# Patient Record
Sex: MALE | Race: WHITE | NOT HISPANIC OR LATINO | Employment: UNEMPLOYED | URBAN - METROPOLITAN AREA
[De-identification: names, ages, dates, MRNs, and addresses within clinical notes are randomized per-mention and may not be internally consistent; named-entity substitution may affect disease eponyms.]

---

## 2024-06-11 ENCOUNTER — TRANSFERRED RECORDS (OUTPATIENT)
Dept: HEALTH INFORMATION MANAGEMENT | Facility: CLINIC | Age: 24
End: 2024-06-11

## 2024-07-10 ENCOUNTER — MEDICAL CORRESPONDENCE (OUTPATIENT)
Dept: HEALTH INFORMATION MANAGEMENT | Facility: CLINIC | Age: 24
End: 2024-07-10
Payer: COMMERCIAL

## 2024-07-11 ENCOUNTER — TRANSCRIBE ORDERS (OUTPATIENT)
Dept: OTHER | Age: 24
End: 2024-07-11

## 2024-07-11 DIAGNOSIS — F19.90 POLYSUBSTANCE USE DISORDER: ICD-10-CM

## 2024-07-11 DIAGNOSIS — G40.909 INTELLECTUAL DISABILITY WITH EPILEPSY (H): Primary | ICD-10-CM

## 2024-07-11 DIAGNOSIS — F79 INTELLECTUAL DISABILITY WITH EPILEPSY (H): Primary | ICD-10-CM

## 2024-07-22 ENCOUNTER — HOSPITAL ENCOUNTER (EMERGENCY)
Facility: HOSPITAL | Age: 24
Discharge: HOME | End: 2024-07-23
Attending: EMERGENCY MEDICINE | Admitting: EMERGENCY MEDICINE
Payer: COMMERCIAL

## 2024-07-22 ENCOUNTER — TELEPHONE (OUTPATIENT)
Dept: BEHAVIORAL HEALTH | Facility: CLINIC | Age: 24
End: 2024-07-22

## 2024-07-22 DIAGNOSIS — F23 ACUTE PSYCHOSIS (H): ICD-10-CM

## 2024-07-22 PROBLEM — F29: Status: ACTIVE | Noted: 2024-07-22

## 2024-07-22 LAB
ALBUMIN SERPL BCG-MCNC: 4.9 G/DL (ref 3.5–5.2)
ALP SERPL-CCNC: 76 U/L (ref 40–150)
ALT SERPL W P-5'-P-CCNC: 87 U/L (ref 0–70)
ANION GAP SERPL CALCULATED.3IONS-SCNC: 11 MMOL/L (ref 7–15)
APAP SERPL-MCNC: <5 UG/ML (ref 10–30)
AST SERPL W P-5'-P-CCNC: 46 U/L (ref 0–45)
BASOPHILS # BLD AUTO: 0.1 10E3/UL (ref 0–0.2)
BASOPHILS NFR BLD AUTO: 1 %
BILIRUB DIRECT SERPL-MCNC: <0.2 MG/DL (ref 0–0.3)
BILIRUB SERPL-MCNC: 0.5 MG/DL
BUN SERPL-MCNC: 10.2 MG/DL (ref 6–20)
CALCIUM SERPL-MCNC: 9.7 MG/DL (ref 8.8–10.4)
CHLORIDE SERPL-SCNC: 104 MMOL/L (ref 98–107)
CREAT SERPL-MCNC: 0.97 MG/DL (ref 0.67–1.17)
EGFRCR SERPLBLD CKD-EPI 2021: >90 ML/MIN/1.73M2
EOSINOPHIL # BLD AUTO: 0.3 10E3/UL (ref 0–0.7)
EOSINOPHIL NFR BLD AUTO: 4 %
ERYTHROCYTE [DISTWIDTH] IN BLOOD BY AUTOMATED COUNT: 12.6 % (ref 10–15)
ETHANOL SERPL-MCNC: <0.01 G/DL
GLUCOSE SERPL-MCNC: 96 MG/DL (ref 70–99)
HCO3 SERPL-SCNC: 23 MMOL/L (ref 22–29)
HCT VFR BLD AUTO: 48.7 % (ref 40–53)
HGB BLD-MCNC: 16.5 G/DL (ref 13.3–17.7)
HOLD SPECIMEN: NORMAL
IMM GRANULOCYTES # BLD: 0 10E3/UL
IMM GRANULOCYTES NFR BLD: 0 %
INR PPP: 1.06 (ref 0.85–1.15)
LIPASE SERPL-CCNC: 15 U/L (ref 13–60)
LITHIUM SERPL-SCNC: 0.41 MMOL/L (ref 0.6–1.2)
LYMPHOCYTES # BLD AUTO: 2.2 10E3/UL (ref 0.8–5.3)
LYMPHOCYTES NFR BLD AUTO: 33 %
MAGNESIUM SERPL-MCNC: 1.9 MG/DL (ref 1.7–2.3)
MCH RBC QN AUTO: 30.8 PG (ref 26.5–33)
MCHC RBC AUTO-ENTMCNC: 33.9 G/DL (ref 31.5–36.5)
MCV RBC AUTO: 91 FL (ref 78–100)
MONOCYTES # BLD AUTO: 0.6 10E3/UL (ref 0–1.3)
MONOCYTES NFR BLD AUTO: 9 %
NEUTROPHILS # BLD AUTO: 3.5 10E3/UL (ref 1.6–8.3)
NEUTROPHILS NFR BLD AUTO: 53 %
NRBC # BLD AUTO: 0 10E3/UL
NRBC BLD AUTO-RTO: 0 /100
PLATELET # BLD AUTO: 215 10E3/UL (ref 150–450)
POTASSIUM SERPL-SCNC: 5.4 MMOL/L (ref 3.4–5.3)
PROT SERPL-MCNC: 7.6 G/DL (ref 6.4–8.3)
RBC # BLD AUTO: 5.35 10E6/UL (ref 4.4–5.9)
SALICYLATES SERPL-MCNC: <0.3 MG/DL
SARS-COV-2 RNA RESP QL NAA+PROBE: NEGATIVE
SODIUM SERPL-SCNC: 138 MMOL/L (ref 135–145)
TSH SERPL DL<=0.005 MIU/L-ACNC: 1.13 UIU/ML (ref 0.3–4.2)
WBC # BLD AUTO: 6.6 10E3/UL (ref 4–11)

## 2024-07-22 PROCEDURE — 83735 ASSAY OF MAGNESIUM: CPT | Performed by: EMERGENCY MEDICINE

## 2024-07-22 PROCEDURE — 80076 HEPATIC FUNCTION PANEL: CPT | Performed by: EMERGENCY MEDICINE

## 2024-07-22 PROCEDURE — 36415 COLL VENOUS BLD VENIPUNCTURE: CPT | Performed by: EMERGENCY MEDICINE

## 2024-07-22 PROCEDURE — 80178 ASSAY OF LITHIUM: CPT | Performed by: EMERGENCY MEDICINE

## 2024-07-22 PROCEDURE — 85610 PROTHROMBIN TIME: CPT | Performed by: STUDENT IN AN ORGANIZED HEALTH CARE EDUCATION/TRAINING PROGRAM

## 2024-07-22 PROCEDURE — 87635 SARS-COV-2 COVID-19 AMP PRB: CPT | Performed by: EMERGENCY MEDICINE

## 2024-07-22 PROCEDURE — 84443 ASSAY THYROID STIM HORMONE: CPT | Performed by: EMERGENCY MEDICINE

## 2024-07-22 PROCEDURE — 80175 DRUG SCREEN QUAN LAMOTRIGINE: CPT | Performed by: EMERGENCY MEDICINE

## 2024-07-22 PROCEDURE — 85025 COMPLETE CBC W/AUTO DIFF WBC: CPT | Performed by: EMERGENCY MEDICINE

## 2024-07-22 PROCEDURE — 82077 ASSAY SPEC XCP UR&BREATH IA: CPT | Performed by: EMERGENCY MEDICINE

## 2024-07-22 PROCEDURE — 83690 ASSAY OF LIPASE: CPT | Performed by: EMERGENCY MEDICINE

## 2024-07-22 PROCEDURE — 80179 DRUG ASSAY SALICYLATE: CPT | Performed by: EMERGENCY MEDICINE

## 2024-07-22 PROCEDURE — 80143 DRUG ASSAY ACETAMINOPHEN: CPT | Performed by: EMERGENCY MEDICINE

## 2024-07-22 PROCEDURE — 82374 ASSAY BLOOD CARBON DIOXIDE: CPT | Performed by: EMERGENCY MEDICINE

## 2024-07-22 PROCEDURE — 250N000013 HC RX MED GY IP 250 OP 250 PS 637: Performed by: EMERGENCY MEDICINE

## 2024-07-22 PROCEDURE — 99283 EMERGENCY DEPT VISIT LOW MDM: CPT

## 2024-07-22 RX ORDER — LITHIUM CARBONATE 300 MG/1
300 TABLET, FILM COATED, EXTENDED RELEASE ORAL EVERY 12 HOURS SCHEDULED
Status: DISCONTINUED | OUTPATIENT
Start: 2024-07-22 | End: 2024-07-22

## 2024-07-22 RX ORDER — PROPRANOLOL HYDROCHLORIDE 10 MG/1
10 TABLET ORAL 2 TIMES DAILY
COMMUNITY
Start: 2024-04-17

## 2024-07-22 RX ORDER — ACYCLOVIR 400 MG/1
400 TABLET ORAL 2 TIMES DAILY
Status: DISCONTINUED | OUTPATIENT
Start: 2024-07-22 | End: 2024-07-23 | Stop reason: HOSPADM

## 2024-07-22 RX ORDER — LEVETIRACETAM 500 MG/1
1000 TABLET ORAL 2 TIMES DAILY
Status: DISCONTINUED | OUTPATIENT
Start: 2024-07-22 | End: 2024-07-23 | Stop reason: HOSPADM

## 2024-07-22 RX ORDER — LEVETIRACETAM 1000 MG/1
1000 TABLET ORAL 2 TIMES DAILY
COMMUNITY
Start: 2024-06-02

## 2024-07-22 RX ORDER — GABAPENTIN 300 MG/1
300 CAPSULE ORAL 3 TIMES DAILY
COMMUNITY
Start: 2024-03-11

## 2024-07-22 RX ORDER — PROPRANOLOL HYDROCHLORIDE 10 MG/1
10 TABLET ORAL 2 TIMES DAILY
Status: DISCONTINUED | OUTPATIENT
Start: 2024-07-22 | End: 2024-07-23 | Stop reason: HOSPADM

## 2024-07-22 RX ORDER — ACYCLOVIR 400 MG/1
400 TABLET ORAL 2 TIMES DAILY
COMMUNITY

## 2024-07-22 RX ORDER — LITHIUM CARBONATE 300 MG/1
600 TABLET, FILM COATED, EXTENDED RELEASE ORAL AT BEDTIME
Status: DISCONTINUED | OUTPATIENT
Start: 2024-07-23 | End: 2024-07-23 | Stop reason: HOSPADM

## 2024-07-22 RX ORDER — ATOMOXETINE 25 MG/1
100 CAPSULE ORAL EVERY MORNING
Status: DISCONTINUED | OUTPATIENT
Start: 2024-07-23 | End: 2024-07-23 | Stop reason: HOSPADM

## 2024-07-22 RX ORDER — QUETIAPINE FUMARATE 200 MG/1
200 TABLET, FILM COATED ORAL AT BEDTIME
COMMUNITY
Start: 2024-04-01

## 2024-07-22 RX ORDER — HYDROXYZINE HYDROCHLORIDE 50 MG/1
50 TABLET, FILM COATED ORAL ONCE
Status: COMPLETED | OUTPATIENT
Start: 2024-07-22 | End: 2024-07-22

## 2024-07-22 RX ORDER — ATOMOXETINE 100 MG/1
1 CAPSULE ORAL EVERY MORNING
COMMUNITY

## 2024-07-22 RX ORDER — LEVETIRACETAM 250 MG/1
250 TABLET ORAL 2 TIMES DAILY
COMMUNITY
Start: 2024-06-02

## 2024-07-22 RX ORDER — LEVETIRACETAM 250 MG/1
250 TABLET ORAL 2 TIMES DAILY
Status: DISCONTINUED | OUTPATIENT
Start: 2024-07-22 | End: 2024-07-23 | Stop reason: HOSPADM

## 2024-07-22 RX ORDER — LITHIUM CARBONATE 300 MG/1
600 TABLET, FILM COATED, EXTENDED RELEASE ORAL AT BEDTIME
COMMUNITY

## 2024-07-22 RX ORDER — GABAPENTIN 300 MG/1
300 CAPSULE ORAL 3 TIMES DAILY
Status: DISCONTINUED | OUTPATIENT
Start: 2024-07-22 | End: 2024-07-23 | Stop reason: HOSPADM

## 2024-07-22 RX ORDER — BENZTROPINE MESYLATE 0.5 MG/1
1 TABLET ORAL DAILY
COMMUNITY
Start: 2024-04-06

## 2024-07-22 RX ORDER — BENZTROPINE MESYLATE 0.5 MG/1
0.5 TABLET ORAL DAILY
Status: DISCONTINUED | OUTPATIENT
Start: 2024-07-23 | End: 2024-07-23 | Stop reason: HOSPADM

## 2024-07-22 RX ORDER — QUETIAPINE FUMARATE 100 MG/1
200 TABLET, FILM COATED ORAL AT BEDTIME
Status: DISCONTINUED | OUTPATIENT
Start: 2024-07-22 | End: 2024-07-23 | Stop reason: HOSPADM

## 2024-07-22 RX ADMIN — ACYCLOVIR 400 MG: 400 TABLET ORAL at 21:59

## 2024-07-22 RX ADMIN — LEVETIRACETAM 1000 MG: 500 TABLET, FILM COATED ORAL at 21:58

## 2024-07-22 RX ADMIN — HYDROXYZINE HYDROCHLORIDE 50 MG: 50 TABLET, FILM COATED ORAL at 16:14

## 2024-07-22 RX ADMIN — LEVETIRACETAM 250 MG: 250 TABLET, FILM COATED ORAL at 22:00

## 2024-07-22 RX ADMIN — GABAPENTIN 300 MG: 300 CAPSULE ORAL at 21:58

## 2024-07-22 RX ADMIN — QUETIAPINE FUMARATE 200 MG: 100 TABLET ORAL at 21:58

## 2024-07-22 RX ADMIN — PROPRANOLOL HYDROCHLORIDE 10 MG: 10 TABLET ORAL at 21:59

## 2024-07-22 ASSESSMENT — ENCOUNTER SYMPTOMS
FEVER: 0
COUGH: 0
DIARRHEA: 0
SHORTNESS OF BREATH: 0
VOMITING: 0
ABDOMINAL PAIN: 0
NAUSEA: 0

## 2024-07-22 ASSESSMENT — ACTIVITIES OF DAILY LIVING (ADL)
ADLS_ACUITY_SCORE: 35
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
## 2024-07-22 NOTE — ED TRIAGE NOTES
"Patient comes from [facility] in [place]. The last 3 weeks at treatment facility patient has been declining, less interactive/responsive, writing a lot in journal. Last night patient grabbed envelopes and started addressing them to various counties. Today patient expressed ideas of hurting himself. It was also brought up that roommates are not feeling safe around him, to which patient stated that \"they should.\" Darnell Pacheco, the tech who took patient here believes patient has a mental health  through [facility] in [facility].   Patient reportedly made threats to roommate last night, did not sleep, pacing all night.   Tech line at facility is [phone]  Maggie, head nurse: [phone]    When writer asked patient about suicidality, patient stated that he was not suicidal but he was having homicidal thoughts. When writer asked if the thoughts were directed at a particular person or in general, patient stated \"I don't know yet.\"        "

## 2024-07-22 NOTE — ED NOTES
"Pt says \"voices in my head have been telling me to hurt other people. Especially during group therapy\"   "

## 2024-07-23 ENCOUNTER — TELEPHONE (OUTPATIENT)
Dept: BEHAVIORAL HEALTH | Facility: CLINIC | Age: 24
End: 2024-07-23
Payer: COMMERCIAL

## 2024-07-23 VITALS
DIASTOLIC BLOOD PRESSURE: 59 MMHG | SYSTOLIC BLOOD PRESSURE: 103 MMHG | RESPIRATION RATE: 20 BRPM | HEART RATE: 68 BPM | TEMPERATURE: 98.7 F | OXYGEN SATURATION: 98 %

## 2024-07-23 LAB
AMPHETAMINES UR QL SCN: NORMAL
BARBITURATES UR QL SCN: NORMAL
BENZODIAZ UR QL SCN: NORMAL
BZE UR QL SCN: NORMAL
CANNABINOIDS UR QL SCN: NORMAL
FENTANYL UR QL: NORMAL
OPIATES UR QL SCN: NORMAL
PCP QUAL URINE (ROCHE): NORMAL

## 2024-07-23 PROCEDURE — 80307 DRUG TEST PRSMV CHEM ANLYZR: CPT | Performed by: EMERGENCY MEDICINE

## 2024-07-23 PROCEDURE — 250N000013 HC RX MED GY IP 250 OP 250 PS 637: Performed by: EMERGENCY MEDICINE

## 2024-07-23 PROCEDURE — 99244 OFF/OP CNSLTJ NEW/EST MOD 40: CPT | Mod: 95 | Performed by: REGISTERED NURSE

## 2024-07-23 RX ADMIN — ATOMOXETINE 100 MG: 25 CAPSULE ORAL at 08:12

## 2024-07-23 RX ADMIN — GABAPENTIN 300 MG: 300 CAPSULE ORAL at 13:35

## 2024-07-23 RX ADMIN — LEVETIRACETAM 250 MG: 250 TABLET, FILM COATED ORAL at 08:15

## 2024-07-23 RX ADMIN — BENZTROPINE MESYLATE 0.5 MG: 0.5 TABLET ORAL at 08:14

## 2024-07-23 RX ADMIN — GABAPENTIN 300 MG: 300 CAPSULE ORAL at 08:14

## 2024-07-23 RX ADMIN — LEVETIRACETAM 1000 MG: 500 TABLET, FILM COATED ORAL at 08:13

## 2024-07-23 RX ADMIN — ACYCLOVIR 400 MG: 400 TABLET ORAL at 08:14

## 2024-07-23 RX ADMIN — PROPRANOLOL HYDROCHLORIDE 10 MG: 10 TABLET ORAL at 08:14

## 2024-07-23 ASSESSMENT — ACTIVITIES OF DAILY LIVING (ADL)
ADLS_ACUITY_SCORE: 35
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
## 2024-07-23 NOTE — ED NOTES
Pt alert, writer given the morning medication, writer unable to assess the pt, pt just stares and don't respond to the questions. Took the scheduled medication without any difficulty.

## 2024-07-23 NOTE — ED NOTES
IP MH Referral Acuity Rating Score (RARS)    LMHP complete at referral to IP MH, with DEC; and, daily while awaiting IP MH placement. Call score to PPS.  CRITERIA SCORING   New 72 HH and Involuntary for IP MH (not adolescent) 0/1   Boarding over 24 hours 0/1   Vulnerable adult at least 55+ with multiple co morbidities; or, Patient age 11 or under 0/1   Suicide ideation without relief of precipitating factors 0/1   Current plan for suicide 0/1   Current plan for homicide 1/1   Imminent risk or actual attempt to seriously harm another without relief of factors precipitating the attempt 1/1   Severe dysfunction in daily living (ex: complete neglect for self care, extreme disruption in vegetative function, extreme deterioration in social interactions) 1/1   Recent (last 2 weeks) or current physical aggression in the ED 0/1   Restraints or seclusion episode in ED 0/1   Verbal aggression, agitation, yelling, etc., while in the ED 0/1   Active psychosis with psychomotor agitation or catatonia 0/1   Need for constant or near constant redirection (from leaving, from others, etc).  1/1   Intrusive or disruptive behaviors 1/1   TOTAL Acuity Total Score: 5

## 2024-07-23 NOTE — TELEPHONE ENCOUNTER
R:  [place]  Access Inpatient Bed Call Log 7/23/2024  @12:00 AM:  Intake has called facilities that have not updated their bed status within the last 12 hours.            [facility] is posting 0 beds.                      [facility] is posting 0 beds. [phone], APS: [phone]            [facility] is posting 0 beds. [phone]                      [facility] is posting 0 beds. [phone]  8:18A PER BESSY BARAJAS TODAY.           [facility] is posting 0 beds. [phone] 12:04 AM Per [facility] 0 beds currently.           [facility] (Ages 18-35) is posting 0 beds. Negative COVID test required, no recent or significant aggression, violence, or sexual assault. [phone]           [facility] is posting 0 beds. [phone]                     [facility] is posting 0 beds. [phone]             [facility] is posting 0 beds. [phone] 12:07 AM Per [place] for adol they are capacity Jul 26th 8:30 AM, for adults they're at capacity until 8:30 AM tomorrow morning.              [facility] is posting 5 beds. Mixed unit 12+. Low acuity only.  DIRECT: [phone]               [facility] is positing 0 beds. No aggression.  [phone]                 [facility] is posting 0 beds. [phone]           [facility] is posting 0 beds. Low acuity only. [phone]            [facility] is posting 1 beds. Low acuity. No current aggression. [phone]           [facility] is posting 0 beds. Low acuity. [phone]           [facility] is posting 0 beds. 72 HH preferred. Low acuity. [phone] 12:12 AM Per facility at capacity for night, call back after 7:30 AM.           [facility] is posting 0 beds. Low acuity. [phone]                is posting 2 beds. Vol only, No Hx of aggression, violence, or assault. No sexual offenders. No 72 hr holds.               [facility] is posting 3 beds. (Must have the cognitive ability to do programming. No aggressive or violent behavior or recent HX in the last 2 yrs. MH must be primary.) [phone]           [facility] is posting 3 beds. Low acuity only. Violence and aggression capped. [phone]             [facility] is posting 0 beds. Low acuity, Neg Covid. [phone] 12:15 AM Per [place] currently 0 beds.           [facility] posting 3 beds. Negative covid.  [phone] 1:18 AM Per [place] CRN, they are now at capacity for the night.              [facility] is posting 2 beds. [phone] no wounds, lines, drains, C-paps, tubes and must be able to care for themselves; no heavy hx of aggression. Pt also needs a confirmed ride from facility upon d/c. 8:14A PER SAVANNA, 2 AVAILABLE BEDS.             [facility] is posting 12 beds. Call for details. [phone] OUT OF STATE 8:10A PER JONATHAN, 4 PEDS AND 5 ADULT.           [facility] is posting 3 beds. Mixed unit.  [phone]      Pt remains on work list pending appropriate bed availability.

## 2024-07-23 NOTE — ED NOTES
ED Behavioral Health Patient Transition of Care Note      Brief behavioral history: 25 yo who presents to the ER with c/o of homicidal thoughts.  Was seen by DEC provider who feels the patient is having hallucinations and experiencing acute psychosis. When asked for more information about his homicidal thoughts, pt is withdrawn, wont answer questions.     Any outstanding medical concerns:  pmhx of seizures, TBI, marijuana abuse.    Has SW seen the patient:  yes    Is the patient on a hold:   vol/hold    Current plan for disposition:  SW attempting to find a bed    Safety concerns:  No, pt has been cooperative    Dietary restrictions:  None, pt can eat and drink ad alon    Significant events during shift:  pt slept throughout night. When RN went in room to do morning vitals, pt not answering questions. Just nodding head.

## 2024-07-23 NOTE — CONSULTS
"Diagnostic Evaluation Consultation  Crisis Assessment    Patient Name: Edward Resendez  Age:  24 year old  Legal Sex: male  Gender Identity: male  Pronouns:   Race: White  Ethnicity: Not  or   Language: English      Patient was assessed: Virtual: Lifetable   Crisis Assessment Start Date: 07/22/24  Crisis Assessment Start Time: 1856  Crisis Assessment Stop Time: 1918  Patient location: [facility] EMERGENCY DEPARTMENT                             JNED-[id number]    Referral Data and Chief Complaint  Edward Resendez presents to the ED per community partner(s). Patient is presenting to the ED for the following concerns: Worsening psychosocial stress, Other (see comment) (commanding auditory hallucinations).   Factors that make the mental health crisis life threatening or complex are:  Pt presents for having commanding auditory hallucinations. Pt reports, \"I wrote that in my journal, how would they know? No one else is around,\" when asked about having hallucinations. Pt reports he was woken up by a treatment staff, who told him he has to get evaluated. Pt reports, \"my eyes weren't fully developed and I don't know what that is about.\" Pt reports he doesn't recall hearing voices, \"I don't know if it's a person or voices.\" Pt reports he doesn't recall seeing or saying any threats to roommates at treatment. Pt does not recall telling ED staff he was hearing voices telling him to harm others. Pt states, \"I have a TBI, I have a poor memory.\" When asked about the descrepancy between treatment staff report and ED report, pt states, \"if it wasn't monitored or recorded, I have nothing to say.\" Pt reports, \"I have air going out of my mouth and air is going in my mouth. I don't know the body well, but it is greatly impacting me.\" Pt then states, \"I was injured when they put me on a committment, I am going to fight it.\" Pt denies SI/SIB/SA/HI and denies plans, means, or intent to harm himself or others. " "Pt reports, \"if I did, I would find a way to cope with it.\" Pt reports he uses breathing and imagination. Pt reports he feels safe returning to the treatment facility..      Informed Consent and Assessment Methods  Explained the crisis assessment process, including applicable information disclosures and limits to confidentiality, assessed understanding of the process, and obtained consent to proceed with the assessment.  Assessment methods included conducting a formal interview with patient, review of medical records, collaboration with medical staff, and obtaining relevant collateral information from family and community providers when available.  : done     Patient response to interventions: unacceptance expressed, verbalizes understanding  Coping skills were attempted to reduce the crisis:  none     History of the Crisis   Pt reports history of anxiety, depression, social anxiety, Y[facility]IS, and bipolar. Pt reports he is one year sober from meth and 1 month and 11 days sober from marijuana. Pt reports he is currently in residential treatment at [facility]. Pt reports 8 previous YOLIS treatments. Pt denies history of inpatient psychiatric care and has been to the ED for mental health, last on 5/30/2024. Pt reports no history of self-harm. Pt reports he attempted to kill himself by choking himself on a metal frame of a bunk bed as a kid. It appears pt has experienced visual and tactile hallucinations before, \"I felt someone come behind me and come at me, like I saw them,\" but does not identify any hallucinations in his history.    Brief Psychosocial History  Family:  Single, Children no  Support System:  None  Employment Status:  unemployed  Source of Income:  none, public assistance  Financial Environmental Concerns:  unemployed  Current Hobbies:   (\"I didn't pay attention in school.\")  Barriers in Personal Life:  mental health concerns    Significant Clinical History  Current Anxiety Symptoms:     Current " "Depression/Trauma:     Current Somatic Symptoms:     Current Psychosis/Thought Disturbance:  auditory hallucinations, visual hallucinations, high risk behavior, flight of ideas, anger  Current Eating Symptoms:     Chemical Use History:  Alcohol: None  Benzodiazepines: None  Opiates: None  Cocaine: None  Marijuana: None  Other Use: None   Past diagnosis:  Substance Use Disorder, Bipolar Disorder, Anxiety Disorder, Depression  Family history:  No known history of mental health or chemical health concerns  Past treatment:  Residential Treatment, Day Treatment, Supportive Living Environment (group home, intermediate house, etc), Psychiatric Medication Management, Primary Care, Individual therapy  Details of most recent treatment:  Pt reports current involvement in medication services and residential YOLIS treatment.  Other relevant history:  Pt reports he currently lives at a treatment facility and is otherwise homeless. Pt denies current legal issues or  involvement. Pt reports history of probation and longterm time for a burglary charge.       Collateral Information  Is there collateral information: Yes     Collateral information name, relationship, phone number:  Darnell Pacheco [facility] staff ([employer]), [phone]    What happened today: Darnell reports pt's mental health has been declining and told his roommate he was going to kill himself.  His roommate asked pt if he (pt's rommate was safe) and pt laughed saying \"I don't know yet\".     What is different about patient's functionin24 Darnell picked pt up in [place], [place] to bring him to treatment.  When he picked pt up, pt was not responding to questions and was just staring, even to questions like \"how are you?\".  A minute into the drive Darnell called 911 at the recommendation of the treatment facility. When EMS/police responded they made it known that they are familiar with pt and said that this is how he presents and has severe depression.  They checked him out and " let him go.  Darnell states he was doing okay for a couple weeks, interacting and participating in treatment however his mental health has been deteriorating since then.  He reports lately pt has been in bed all day and all night, except last night where he was up all night staring and laughing.  In the last 1-2 days has been writing a lot in his journal and asked for 90 envelopes and filling out addresses to different counties to send letters,  but they're not sure what they contain.  His roommate would ask him questions and he would just stare.     Concern about alcohol/drug use:      What do you think the patient needs:      Has patient made comments about wanting to kill themselves/others: yes    If d/c is recommended, can they take part in safety/aftercare planning:  yes    Additional collateral information:  Darnell believes pt is on CD commitment and has a CD  and MH  through the [facility] and they recommended a higher level of care for his mental health.  Any further recommendations or care planning can be directed to Maggie, head nurse: [phone].  Pt has not been discharged from the program. Writer also spoke to another tech, who confirmed pt has been acting erratically, that pt roommate feels at danger by pt, and that pt has been responding oddly to questions, or not responding at all. Reports pt asked for all counties in [place] and asked for [facility] headquarters and envelopes to mail all the counties.     Risk Assessment  [place] Suicide Severity Rating Scale Full Clinical Version:  Suicidal Ideation  Q1 Wish to be Dead (Lifetime): Yes  Q2 Non-Specific Active Suicidal Thoughts (Lifetime): Yes  3. Active Suicidal Ideation with any Methods (Not Plan) Without Intent to Act (Lifetime): Yes  Q4 Active Suicidal Ideation with Some Intent to Act, Without Specific Plan (Lifetime): Yes  Q5 Active Suicidal Ideation with Specific Plan and Intent (Lifetime): Yes  Q6 Suicide Behavior (Lifetime): yes      Suicidal Behavior (Lifetime)  Actual Attempt (Lifetime): Yes  Total Number of Actual Attempts (Lifetime): 1  Actual Attempt Description (Lifetime): as a kid, attempting to choke himself  Has subject engaged in non-suicidal self-injurious behavior? (Lifetime): No  Interrupted Attempts (Lifetime): No  Aborted or Self-Interrupted Attempt (Lifetime): No  Preparatory Acts or Behavior (Lifetime): No    [place] Suicide Severity Rating Scale Recent:   Suicidal Ideation (Recent)  Q1 Wished to be Dead (Past Month): no  Q2 Suicidal Thoughts (Past Month): no  If yes to Q6, within past 3 months?: no  Level of Risk per Screen: moderate risk          Environmental or Psychosocial Events: challenging interpersonal relationships, other life stressors, unemployment/underemployment, unstable housing, homelessness, excessive debt, poor finances, social isolation, legal issues such as DWI, DUI, lawsuit, CPS involvement, etc.  Protective Factors: Protective Factors: lives in a responsibly safe and stable environment, supportive ongoing medical and mental health care relationships    Does the patient have thoughts of harming others? Feels Like Hurting Others: no  Previous Attempt to Hurt Others: no  Current presentation: Irritable  Violence Threats in Past 6 Months: pt denies  Current Violence Plan or Thoughts: pt denies  Is the patient engaging in sexually inappropriate behavior?: no  Duty to warn initiated: no    Is the patient engaging in sexually inappropriate behavior?  no        Mental Status Exam   Affect: Blunted  Appearance: Appropriate  Attention Span/Concentration: Attentive  Eye Contact: Engaged    Fund of Knowledge: Appropriate   Language /Speech Content: Fluent  Language /Speech Volume: Normal  Language /Speech Rate/Productions: Normal  Recent Memory: Poor  Remote Memory: Intact  Mood: Irritable, Apathetic  Orientation to Person: Yes   Orientation to Place: Yes  Orientation to Time of Day: Yes  Orientation to Date: Yes    "  Situation (Do they understand why they are here?): Yes  Psychomotor Behavior: Normal  Thought Content: Hallucinations, Homicidal, Paranoia  Thought Form: Loose Associations, Flight of Ideas     Mini-Cog Assessment  Number of Words Recalled:    Clock-Drawing Test:     Three Item Recall:    Mini-Cog Total Score:       Medication  Psychotropic medications:   Medication Orders - Psychiatric (From admission, onward)      None             Current Care Team  Patient Care Team:  No Ref-Primary, Physician as PCP - General    Diagnosis  Patient Active Problem List   Diagnosis Code    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 Pt displays the following risk factors that support IP admission: Pt presents to ED for commanding auditory hallucinations telling him to harm others. Pt is inconsistent in his reports in ED, stating hallucinations to ED staff, then denying them in assessment, then confirming that he journaled about them in his room. Pt appears to be responding to internal stimuli and appears to be paranoid. Pt referenced that if things are not recorded by video, \"they never happened,\" and has a flight of ideas in interview. Additionally, [facility] staff report pt has been decompenstaing in treatment; sometimes not responding to staff, staring in room and laughing in the middle of the night, and attempting to mail all [place] counties in the [facility]. Pt appears lack insight into his presentation and symptoms and appears as a poor historian. Pt is unable to engage in safety planning to mitigate risk level in a non-secure setting. Lower levels of care are not sufficient. Due to this IP is the " least restrictive option of care for pt. Pt should remain in IP until deemed safe to return to the community and engage in [facility] supports. Pt would benefit from coordination with [place] upon discharge.       Imminent risk of harm: Homicidal Thoughts or Behaviors  Severe psychiatric, behavioral or other comorbid conditions are appropriate for management at inpatient mental health as indicated by at least one of the following: Psychiatric Symptoms  Severe dysfunction in daily living is present as indicated by at least one of the following: Complete withdrawal from all social interactions, Other evidence of severe dysfunction  Situation and expectations are appropriate for inpatient care: Biopsychosocial stresses potentially contributing to clinical presentation (co morbidities) have been assessed and are absent or manageable at proposed level of care  Inpatient mental health services are necessary to meet patient needs and at least one of the following: Specific condition related to admission diagnosis is present and judged likely to further improve at proposed level of care, Specific condition related to admission diagnosis is present and judged likely to deteriorate in absence of treatment at proposed level of care      Patient coping skills attempted to reduce the crisis:  none    Disposition  Recommended disposition: Inpatient Mental Health        Reviewed case and recommendations with attending provider. Attending Name: Dr. Patel       Attending concurs with disposition: yes       Patient and/or validated legal guardian concurs with disposition:   no       Final disposition:  inpatient mental health    Legal status on admission: Voluntary/Patient has signed consent for treatment    Assessment Details   Total duration spent with the patient: 22 min     CPT code(s) utilized: Non-Billable    LOU Butler, Psychotherapist  DEC - Triage & Transition Services  Callback: [phone]

## 2024-07-23 NOTE — TELEPHONE ENCOUNTER
Triage and Transition Services- Patient Follow Up Call  Service Line Making Phone Call: Extended Care    Who did Writer Talk to: Patient    Details of Call: left message to return my call on answering machine    Mayuri Robison 7/23/2024 3:46 PM

## 2024-07-23 NOTE — ED PROVIDER NOTES
[facility] EMERGENCY DEPARTMENT   ED Mental Health Observation - Initiation Note    Edward Resendez was placed into observation at 7:34 PM on 7/22/2024 for Mental health crisis and Psychosis.   Patient is expected to be under observation status for a minimum of eight hours.    MD Amanda Gutierres, Mackenzie Iqbal MD  07/22/24 1934

## 2024-07-23 NOTE — DISCHARGE INSTRUCTIONS
Please continue your current medications.  Follow-up with your mental health team.  If you have any escalation to your symptoms develop additional concern please return to the emergency department for assessment.

## 2024-07-23 NOTE — ED PROVIDER NOTES
[facility] EMERGENCY DEPARTMENT   ED Mental Health Observation - Daily Note for 7/23/2024    Edward Resendez is a 24 year old male currently boarding in the ED while awaiting placement for Mental health crisis.  Please see the initial H&P for this patient's presentation, workup, and disposition plan.     Hold Status:  Patient is Voluntary, but holdable    Plan:  In brief, the patient's presentation is notable for acute psychosis and homicidal ideation based on review of initial treating clinicians note..   Patient is awaiting Mental health placement due to boarding patient with ongoing mental health assessment here in the emergency department.    Interim History:  Met with patient he was calm and cooperative and was awaiting mental health nurse practitioner input    Physical Exam:  /59   Pulse 68   Temp 98.7  F (37.1  C) (Oral)   Resp 20   SpO2 98%   No respiratory distress, on room air   Well perfused  Behavior appropriate    Medications provided prior to my care:  Medications   acyclovir (ZOVIRAX) tablet 400 mg (400 mg Oral $Given 7/23/24 0814)   atomoxetine (STRATTERA) capsule 100 mg (100 mg Oral $Given 7/23/24 0812)   benztropine (COGENTIN) tablet 0.5 mg (0.5 mg Oral $Given 7/23/24 0814)   gabapentin (NEURONTIN) capsule 300 mg (300 mg Oral $Given 7/23/24 1335)   levETIRAcetam (KEPPRA) tablet 1,000 mg (1,000 mg Oral $Given 7/23/24 0813)   levETIRAcetam (KEPPRA) tablet 250 mg (250 mg Oral $Given 7/23/24 0815)   propranolol (INDERAL) tablet 10 mg (10 mg Oral $Given 7/23/24 0814)   QUEtiapine (SEROquel) tablet 200 mg (200 mg Oral $Given 7/22/24 2158)   lithium ER (LITHOBID) CR tablet 600 mg (has no administration in time range)   hydrOXYzine HCl (ATARAX) tablet 50 mg (50 mg Oral $Given 7/22/24 1614)       Laboratory (reviewed and interpreted):  Labs Ordered and Resulted from Time of ED Arrival to Time of ED Departure   BASIC METABOLIC PANEL - Abnormal       Result Value     Sodium 138      Potassium 5.4 (*)     Chloride 104      Carbon Dioxide (CO2) 23      Anion Gap 11      Urea Nitrogen 10.2      Creatinine 0.97      GFR Estimate >90      Calcium 9.7      Glucose 96     ACETAMINOPHEN LEVEL - Abnormal    Acetaminophen <5.0 (*)    LITHIUM LEVEL - Abnormal    Lithium 0.41 (*)    HEPATIC FUNCTION PANEL - Abnormal    Protein Total 7.6      Albumin 4.9      Bilirubin Total 0.5      Alkaline Phosphatase 76      AST 46 (*)     ALT 87 (*)     Bilirubin Direct <0.20     ETHYL ALCOHOL LEVEL - Normal    Alcohol ethyl <0.01     SALICYLATE LEVEL - Normal    Salicylate <0.3     LIPASE - Normal    Lipase 15     MAGNESIUM - Normal    Magnesium 1.9     TSH WITH FREE T4 REFLEX - Normal    TSH 1.13     INR - Normal    INR 1.06     COVID-19 VIRUS (CORONAVIRUS) BY PCR - Normal    SARS CoV2 PCR Negative     URINE DRUG SCREEN PANEL - Normal    Amphetamines Urine Screen Negative      Barbituates Urine Screen Negative      Benzodiazepine Urine Screen Negative      Cannabinoids Urine Screen Negative      Cocaine Urine Screen Negative      Fentanyl Qual Urine Screen Negative      Opiates Urine Screen Negative      PCP Urine Screen Negative     CBC WITH PLATELETS AND DIFFERENTIAL    WBC Count 6.6      RBC Count 5.35      Hemoglobin 16.5      Hematocrit 48.7      MCV 91      MCH 30.8      MCHC 33.9      RDW 12.6      Platelet Count 215      % Neutrophils 53      % Lymphocytes 33      % Monocytes 9      % Eosinophils 4      % Basophils 1      % Immature Granulocytes 0      NRBCs per 100 WBC 0      Absolute Neutrophils 3.5      Absolute Lymphocytes 2.2      Absolute Monocytes 0.6      Absolute Eosinophils 0.3      Absolute Basophils 0.1      Absolute Immature Granulocytes 0.0      Absolute NRBCs 0.0     LAMOTRIGINE LEVEL       ED Course:  11:46 AM   I met with the patient and discussed plan, still boarding pending mental health placement but given capacity issues is planning for assessment by mental health nurse  practitioner later today will update with any additional information provided by medical team.    2:00 PM  Patient underwent mental assessment by nurse practitioner he has been calm and cooperative over the course of my shift and is improved compared to initial review of ED clinician note.  Mental health nurse practitioner felt patient is appropriate for discharge to continue his current medications and follow-up with his mental health team I went and had a long discussion with the patient and reviewed everything he was feeling improved he is feeling safe for discharge and denying the symptomatology that he ultimately had a presented with.  He was alert and cooperative and at least during the course of my examination not demonstrating acute psychosis.  Per discussion with mental health team we will proceed with instructions to continue his current medications and follow-up with his mental health team.  I updated the nursing staff.     Impression/Plan:  1. Acute psychosis (H)        MD Marta Pepe Scott E., MD  07/23/24 [id number]       Ben Lozano MD  07/23/24 7217

## 2024-07-23 NOTE — TELEPHONE ENCOUNTER
R:    9:07a Called [facility] TRF Shaila to inquire about ability to review pt for potential admission. TRF informed they will notify Resource Nurse and have RN CB Intake.    9:41a Clinical faxed to TRF, awaiting review response.     9:42a Called [facility] ED Nurse Ornelas to inform pt's UDS needs collection for review at TRF. Nurse informed is currently working on getting pt to use restroom for lab.    10:45a Refaxed to TR excluding UDS, awaiting response.     10:50a Called [facility] to inquire about notification they called Intake. TRF informed yes and wanting further clarification before clinical review, Intake informed clinical faxed to TRF. TRF notes receiving clinical.     1:01p UDS faxed to TRF. Awaiting response.     1:58p Paged Psychiatry NP Lupe for review of pt, awaiting response.     2:06p Received call from Psychiatry NP Lupe informing there is a not from ED Provider stating recommendation for discharge.     3:12p Called [facility] ED, ED informed pt has discharged from ED. Intake notes looking in pt station and pt is no longer in ED. Intake notes pt removed from active placement [facility]. Intake will no longer follow for IP MH admission.

## 2024-07-23 NOTE — PROGRESS NOTES
"Triage & Transition Services, Extended Care       Patient: Edward goes by \"Edward,\" uses he/him pronouns  Date of Service: July 23, 2024  Site of Service: [facility] EMERGENCY DEPARTMENT                               Patient was seen yes Virtual: AmWell  Mode of Assessment: Virtual: AmWell    Patient is followed related to: long wait time for admission  Notable observations from today's encounter include: Pt is seen by extended care for therapeutic check-in and reassessment. Exchanged greeting, introduced self and role. Pt was seen briefly for 3 minutes. Pt indicated he wanted to leave the hospital. He was loud, cursing, and seemed angry. Pt kept asking why he was there. Writer mentioned inpatient mental health and he stated, \"fuck you\" and ended the session.  The care team is working towards the following: Learn and Demonstrate at [employer] Focused on Crisis Stabilization  Significant status changes: yes  Case Management included:      Recommendations: Final Disposition / Recommended Care Path: discharge  Plan for Care reviewed with assigned Medical Provider: yes  Plan for Care Team Review: other (see comment)  Comments: Writer consulted with C&L provider Comfort Mayer regarding dispostion. She recommends discharge and patient to follow up with psychiatry.  Patient and/or validated legal guardian concurs: yes    Clinical Substantiation: Pt denies suicidal or homicidal ideation. He declines wanting to see a therapist and declines IPMH. Pt does not appear to be holdable and requests discharge. He will follow up with his psychiatrist at [facility].    Summary: Pt denies suicidal or homicidal ideation. He declines wanting to see a therapist and declines IPMH. Pt does not appear to be holdable and requests discharge. He will follow up with his psychiatrist at [facility].    Legal Status: [facility]: [facility]  Legal Status at Admission: Voluntary/Patient " has signed consent for treatment    Willi Fierro, LICSW   Licensed Mental Health Professional (LMHP), [facility]  [phone]

## 2024-07-23 NOTE — PHARMACY-ADMISSION MEDICATION HISTORY
Pharmacist Admission Medication History    Admission medication history is complete. The information provided in this note is only as accurate as the sources available at the time of the update.    Information Source(s): Patient, Facility (U/NH/) medication list/MAR, and CareEverywhere/SureScripts via in-person and phone    Pertinent Information: Spoke with Maggie from [facility].     Changes made to PTA medication list:  Added: all  Deleted: None  Changed: None    Allergies reviewed with patient and updates made in EHR: yes    Medication History Completed By: ARIAN CEBALLOS [facility] 7/22/2024 9:05 PM    PTA Med List   Medication Sig Last Dose    acyclovir (ZOVIRAX) 400 MG tablet Take 400 mg by mouth 2 times daily 7/22/2024 at AM    atomoxetine (STRATTERA) 100 MG capsule Take 1 capsule by mouth every morning 7/22/2024 at AM    benztropine (COGENTIN) 0.5 MG tablet Take 1 tablet by mouth daily 7/22/2024 at AM    cholecalciferol (VITAMIN D3) 25 mcg (1000 units) capsule Take 1,000 Units by mouth daily 7/22/2024 at AM    diclofenac (VOLTAREN) 1 % topical gel Apply 2 g topically daily as needed for moderate pain Past Week    gabapentin (NEURONTIN) 300 MG capsule Take 300 mg by mouth 3 times daily 7/22/2024 at AM    levETIRAcetam (KEPPRA) 1000 MG tablet Take 1,000 mg by mouth 2 times daily 7/22/2024 at AM    levETIRAcetam (KEPPRA) 250 MG tablet Take 250 mg by mouth 2 times daily 7/22/2024 at AM    lithium ER (LITHOBID) 300 MG CR tablet Take 600 mg by mouth at bedtime 7/22/2024 at AM    propranolol (INDERAL) 10 MG tablet Take 10 mg by mouth 2 times daily 7/22/2024 at AM    QUEtiapine (SEROQUEL) 200 MG tablet Take 200 mg by mouth at bedtime 7/21/2024 at HS

## 2024-07-23 NOTE — PLAN OF CARE
"Edward Resendez  July 22, 2024  Plan of Care Hand-off Note     Patient Care Path: inpatient mental health    Plan for Care:   It is the recommendation of this clinician that pt admit to IP MH for safety and stabilization. Pt displays the following risk factors that support IP admission: Pt presents to ED for commanding auditory hallucinations telling him to harm others. Pt is inconsistent in his reports in ED, stating hallucinations to ED staff, then denying them in assessment, then confirming that he journaled about them in his room. Pt appears to be responding to internal stimuli and appears to be paranoid. Pt referenced that if things are not recorded by video, \"they never happened,\" and has a flight of ideas in interview. Additionally, [facility] staff report pt has been decompenstaing in treatment; sometimes not responding to staff, staring in room and laughing in the middle of the night, and attempting to mail [place] in the [facility]. Pt appears lack insight into his presentation and symptoms and appears as a poor historian. Pt is unable to engage in safety planning to mitigate risk level in a non-secure setting. Lower levels of care are not sufficient. Due to this IP is the least restrictive option of care for pt. Pt should remain in IP until deemed safe to return to the community and engage in OP MH supports. Pt would benefit from coordination with [facility] upon discharge.    Identified Goals and Safety Issues: stabilize mental health, resolve commanding auditory hallucinations    Overview:  [place] [employer] line [phone] Maggie, head nurse: [phone]          Legal Status: Legal Status at Admission: Voluntary/Patient has signed consent for treatment    Psychiatry Consult: no       Updated   regarding plan of care.           Diane Preciado [facility]        "

## 2024-07-23 NOTE — TELEPHONE ENCOUNTER
R: [facility]IDE    [facility] Access Inpatient Bed Call Log 7/23/24 @7:31 AM:   Intake has called facilities that have not updated the bed status within the last 12 hours.                                                    [facility] is at capacity                               [facility] is posting 0 beds. [phone] Per call at 7:38am to [facility] at capacity boarding from their ED.                      [facility] is posting 0 beds. Negative covid required                      [facility] is posting 0 beds. Neg covid. No high school/Pita-psych. [phone]. Per call at 7:39am to [facility] currently FULL.                      [facility] is posting 0 beds. [phone]                      [facility] is posting 0 beds. [phone]                       [facility] is posting 3 beds. Negative covid. [phone]. Per call at 7:41am to [facility]A, 1 child and no adol beds are available.                      [facility] is posting 0 beds [phone]                            [facility] is posting 5 beds. LOW acuity ONLY. Mixed unit 12+. Negative covid- [phone]                      [facility] has 0 bed posted. No aggression. Negative Covid. Low acuity.                      [facility] is posting 0 beds. Negative covid. [phone]. Per call at 7:59am [facility] adult and adol beds are at capacity.                      [facility] ([place]) is posting 0 beds. Low acuity only. Neg covid.  [phone]                      [facility] is posting 1 beds. Low acuity. No current aggression.                        [facility] ([place]) is posting 0 beds available. Negative covid.  [phone].                       [facility] is posting 0 beds. Low acuity. 72 HH hold preferred. Negative covid required. [phone] Per call at 7:44am to Grace would need to follow up after 8:45am.                      [facility] ([place]) is  posting 0 beds. Low acuity only. Neg covid.  [phone] Per call at 7:45am to [facility] are at [facility] and [place] 1 or 2 beds for review.                      [facility] in [place] is posting 2 beds.  Negative covid required.   Vol only, No history of aggression, violence, or assault. No sexual offenders. No 72 HH holds. [phone]                             [facility] is posting 3 beds. Negative covid required.  (Must have the cognitive ability to do programming. No aggressive or violent behavior or recent HX in the last 2 yrs. MH must be primary.) Always low acuity. [phone]                       [facility] has 2 beds posted. Negative covid required.  Low acuity only. Violence and aggression capped.  [phone]. Per call at 8:02am to [facility] low acuity beds only.                      [facility] is posting 3 beds. Low acuity, Negative covid required. [phone] Per call at 7:49am no answer unable to LVM.                      [facility] [place] posting 3 beds Negative covid required.  [phone]                       [facility] is posting 2 beds. Negative covid. LOW acuity. (No lines, drains, or tubes, oxygen, CPAP, IV, etc.) Must Have a Ride Home. [phone] Per call at7:53am to [facility] 4 open beds for review.                      [facility] is posting 3 beds. Negative covid. (No. lines, drains, or tubes, oxygen, CPAP, IV, etc.) [phone] Per call at 7:53am to [facility] 3 general/high acuity beds poss available.       Pt remains on the work list pending appropriate bed availability.

## 2024-07-23 NOTE — CONSULTS
"      Psychiatry Consultation; Follow up              Reason for Consult, requesting source:    Recommendations    Requesting source: Ben Lozano    Labs and imaging reviewed. Provider contacted with recommendations.     Telemedicine Visit: The patient was seen for a visit utilizing the [employer] system. Permission from the patient to conduct the exam by telemedicine was obtained prior to proceeding.  Edward was also informed that insurance will be billed for this contact.   Patient Location):  [facility]   Provider Location: [employer]/remote  As the provider I attest to compliance with applicable laws and regulations related to telemedicine            Interim history:    Psychiatry seeing patient today regarding recommendations.     Edward Resendez is a 24 year old male with history of seizures, TBI, substance abuse (synthetic marijuana) who presents to the ER with complaints of homicidal thoughts.     Mental health history:  Pt reports history of anxiety, depression, social anxiety, YOLIS, and bipolar. Pt reports he is one year sober from meth and 1 month and 11 days sober from marijuana. Pt reports he is currently in residential treatment at [facility]. Pt reports 8 previous YOLIS treatments. Pt denies history of inpatient psychiatric care and has been to the ED for mental health, last on 5/30/2024. Pt reports no history of self-harm. Pt reports he attempted to kill himself by choking himself on a metal frame of a bunk bed as a kid. It appears pt has experienced visual and tactile hallucinations before, \"I felt someone come behind me and come at me, like I saw them,\" but does not identify any hallucinations in his history.     Today, patient reports, \"I'm not going to share anything with you. Those are my own private thoughts. I thought you were the person who was going to find me a place to go. Why are you asking me about my mental health?\" He reports that he feels irritable \"When I " "don't know things.\" He shares that he has been taking his medications as prescribed and \"My lithium just makes me get my blood drawn and Seroquel knocks me the fuck out.\" Patient denies SI/SIB, as well as thoughts of harming others. When asked if he has thoughts of acting violently towards others, he reports, \"No. I don't.\"  Patient states that he is agreeable to returning to his treatment facility and has medication management through [facility].         Current Medications:     Current Facility-Administered Medications   Medication Dose Route Frequency Provider Last Rate Last Admin    acyclovir (ZOVIRAX) tablet 400 mg  400 mg Oral BID Mackenzie Patel MD   400 mg at 07/23/24 0814    atomoxetine (STRATTERA) capsule 100 mg  100 mg Oral QAM Mackenzie Patel MD   100 mg at 07/23/24 0812    benztropine (COGENTIN) tablet 0.5 mg  0.5 mg Oral Daily Mackenzie Patel MD   0.5 mg at 07/23/24 0814    gabapentin (NEURONTIN) capsule 300 mg  300 mg Oral TID Mackenzie Patel MD   300 mg at 07/23/24 0814    levETIRAcetam (KEPPRA) tablet 1,000 mg  1,000 mg Oral BID Mackenzie Patel MD   1,000 mg at 07/23/24 0813    levETIRAcetam (KEPPRA) tablet 250 mg  250 mg Oral BID Mackenzie Patel MD   250 mg at 07/23/24 0815    lithium ER (LITHOBID) CR tablet 600 mg  600 mg Oral At Bedtime Mackenzie Patel MD        propranolol (INDERAL) tablet 10 mg  10 mg Oral BID Mackenzie Patel MD   10 mg at 07/23/24 0814    QUEtiapine (SEROquel) tablet 200 mg  200 mg Oral At Bedtime Mackenzie Patel MD   200 mg at 07/22/24 2158              MSE:   Denies SI/SIB, as well as thoughts of harming others. Denies AH/VH. Patient irritable and verbally combative.     Vital signs:  Temp: 98.7  F (37.1  C) Temp src: Oral BP: 103/59 Pulse: 68   Resp: 20 SpO2: 98 % O2 Device: None (Room air)        There is no height or weight on file to calculate BMI.    Qtc: No current EKG         " DSM-5 Diagnosis:   Bipolar Disorder  Generalized Anxiety Disorder  Substance-use Disorder, by history, currently in treatment  Unspecified Personality Disorder, r/o          Assessment:   Psychiatry seeing patient today regarding recommendations. He has the above historical diagnoses, admitted to the ED for concerns regarding homicidal thoughts. Patient currently lives in a treatment facility and was writing thoughts of harming others in his journal. During our meeting, patient was quite irritable and did not wish to fully participate in discussion with this writer. However, he currently denies wishes to harm or aggress towards others. In addition, he denies thoughts of harming himself.     Patient presentation of irritability and combativeness could be a multi-factorial, including his history of both TBI and bipolar disorder, as well as possible underlying personality disorder.     Lithium level: 0.41 (L). Lithium level is slightly below therapeutic level, so patient should plan to follow up with his outpatient provider for additional adjustments.           Summary of Recommendations:   1) Continue PTA medications and plan to follow up with outpatient provider for medication adjustments    2) Could consider individual therapy, but patient declines referrals at this time.     3) Today Ewdard does not show any symptoms of acute catarina, nor suicidal or homicidal ideations. Therefore, based on all available evidence including the factors cited above, he does not appear to be at imminent risk for self-harm, does not meet criteria for a 72-hr hold, and therefore remains appropriate for ongoing outpatient level of care.     Additional steps taken to minimize risk include: Patient has current medications that he takes as prescribed, as well as outpatient psychiatry for medication management. He plans to return to his treatment facility.     Resources:   Crisis Intervention: 650.466.6708 or 450-246-5258 (TTY: 477.731.4462).   "Call anytime for help.  National [place] on Mental Illness (www.[place].bishop.org): 741.345.5608 or 973-219-2588.  Suicide Awareness Voices of Education (SAVE) (www.save.org): 701-518-EFGA (7140)  National Suicide Prevention Line (www.mentalhealthmn.org): 740-430-HFKY (6381)  Mental Health Consumer/Survivor Network of [place] (www.mhcsn.net): 357.239.1495 or 465-011-6186  [facility] Crisis Response 555 408-0825  Text 4 Life: txt \"LIFE\" to 13557 for immediate support and crisis intervention  Crisis text line: Text \"[place]\" to 268614. Free, confidential, 24/7.        [facility] ([place] on Mental Illness) improves the lives of children and adults with mental illnesses and their families by providing free classes on mental illnesses and support groups for adults with mental illnesses, parents and family members. For more information: Phone: 894.890.4204 Toll free: 7-631-LCBL-HELPS Website: www.namihelps.orghttp://www.namihelps.org/            Page me or re-consult psychiatry as needed (psychiatry is signing off).       KORTNEY Clancy, CARLYN  Consult/Liaison Psychiatry  [employer]   Contact information available via [facility] Paging/Directory.  If I am not available, please call [facility] intake ([phone])         "

## 2024-07-24 LAB — LAMOTRIGINE SERPL-MCNC: <0.9 UG/ML

## 2024-07-27 ENCOUNTER — APPOINTMENT (OUTPATIENT)
Dept: RADIOLOGY | Facility: HOSPITAL | Age: 24
End: 2024-07-27
Attending: EMERGENCY MEDICINE
Payer: COMMERCIAL

## 2024-07-27 ENCOUNTER — HOSPITAL ENCOUNTER (EMERGENCY)
Facility: HOSPITAL | Age: 24
Discharge: ANOTHER HOSPITAL | End: 2024-07-31
Attending: EMERGENCY MEDICINE | Admitting: EMERGENCY MEDICINE
Payer: COMMERCIAL

## 2024-07-27 ENCOUNTER — TELEPHONE (OUTPATIENT)
Dept: BEHAVIORAL HEALTH | Facility: CLINIC | Age: 24
End: 2024-07-27

## 2024-07-27 DIAGNOSIS — F22 PARANOIA (H): ICD-10-CM

## 2024-07-27 DIAGNOSIS — F23 ACUTE PSYCHOSIS (H): ICD-10-CM

## 2024-07-27 DIAGNOSIS — R46.89 AGGRESSIVE BEHAVIOR: ICD-10-CM

## 2024-07-27 DIAGNOSIS — R45.850 HOMICIDAL IDEATION: ICD-10-CM

## 2024-07-27 PROBLEM — F79 INTELLECTUAL DISABILITY: Status: ACTIVE | Noted: 2024-07-27

## 2024-07-27 LAB
ALBUMIN SERPL BCG-MCNC: 4.7 G/DL (ref 3.5–5.2)
ALBUMIN UR-MCNC: NEGATIVE MG/DL
ALP SERPL-CCNC: 79 U/L (ref 40–150)
ALT SERPL W P-5'-P-CCNC: 79 U/L (ref 0–70)
AMPHETAMINES UR QL SCN: NORMAL
ANION GAP SERPL CALCULATED.3IONS-SCNC: 11 MMOL/L (ref 7–15)
APPEARANCE UR: CLEAR
AST SERPL W P-5'-P-CCNC: 32 U/L (ref 0–45)
BARBITURATES UR QL SCN: NORMAL
BENZODIAZ UR QL SCN: NORMAL
BILIRUB SERPL-MCNC: 0.4 MG/DL
BILIRUB UR QL STRIP: NEGATIVE
BUN SERPL-MCNC: 11.6 MG/DL (ref 6–20)
BZE UR QL SCN: NORMAL
CALCIUM SERPL-MCNC: 9.4 MG/DL (ref 8.8–10.4)
CANNABINOIDS UR QL SCN: NORMAL
CHLORIDE SERPL-SCNC: 107 MMOL/L (ref 98–107)
COLOR UR AUTO: ABNORMAL
CREAT SERPL-MCNC: 1 MG/DL (ref 0.67–1.17)
EGFRCR SERPLBLD CKD-EPI 2021: >90 ML/MIN/1.73M2
ERYTHROCYTE [DISTWIDTH] IN BLOOD BY AUTOMATED COUNT: 12.6 % (ref 10–15)
FENTANYL UR QL: NORMAL
GLUCOSE SERPL-MCNC: 107 MG/DL (ref 70–99)
GLUCOSE UR STRIP-MCNC: NEGATIVE MG/DL
HCO3 SERPL-SCNC: 23 MMOL/L (ref 22–29)
HCT VFR BLD AUTO: 47.1 % (ref 40–53)
HGB BLD-MCNC: 16 G/DL (ref 13.3–17.7)
HGB UR QL STRIP: NEGATIVE
KETONES UR STRIP-MCNC: NEGATIVE MG/DL
LEUKOCYTE ESTERASE UR QL STRIP: NEGATIVE
MCH RBC QN AUTO: 30.8 PG (ref 26.5–33)
MCHC RBC AUTO-ENTMCNC: 34 G/DL (ref 31.5–36.5)
MCV RBC AUTO: 91 FL (ref 78–100)
MUCOUS THREADS #/AREA URNS LPF: PRESENT /LPF
NITRATE UR QL: NEGATIVE
OPIATES UR QL SCN: NORMAL
PCP QUAL URINE (ROCHE): NORMAL
PH UR STRIP: 6.5 [PH] (ref 5–7)
PLATELET # BLD AUTO: 219 10E3/UL (ref 150–450)
POTASSIUM SERPL-SCNC: 4.2 MMOL/L (ref 3.4–5.3)
PROT SERPL-MCNC: 7.3 G/DL (ref 6.4–8.3)
RBC # BLD AUTO: 5.19 10E6/UL (ref 4.4–5.9)
RBC URINE: <1 /HPF
SODIUM SERPL-SCNC: 141 MMOL/L (ref 135–145)
SP GR UR STRIP: 1.02 (ref 1–1.03)
UROBILINOGEN UR STRIP-MCNC: <2 MG/DL
WBC # BLD AUTO: 6.5 10E3/UL (ref 4–11)
WBC URINE: <1 /HPF

## 2024-07-27 PROCEDURE — 72100 X-RAY EXAM L-S SPINE 2/3 VWS: CPT

## 2024-07-27 PROCEDURE — 73130 X-RAY EXAM OF HAND: CPT | Mod: RT

## 2024-07-27 PROCEDURE — 36415 COLL VENOUS BLD VENIPUNCTURE: CPT | Performed by: EMERGENCY MEDICINE

## 2024-07-27 PROCEDURE — 250N000013 HC RX MED GY IP 250 OP 250 PS 637: Performed by: EMERGENCY MEDICINE

## 2024-07-27 PROCEDURE — 80053 COMPREHEN METABOLIC PANEL: CPT | Performed by: EMERGENCY MEDICINE

## 2024-07-27 PROCEDURE — 85027 COMPLETE CBC AUTOMATED: CPT | Performed by: EMERGENCY MEDICINE

## 2024-07-27 PROCEDURE — 99284 EMERGENCY DEPT VISIT MOD MDM: CPT | Mod: 25

## 2024-07-27 PROCEDURE — 81001 URINALYSIS AUTO W/SCOPE: CPT | Performed by: EMERGENCY MEDICINE

## 2024-07-27 PROCEDURE — 80307 DRUG TEST PRSMV CHEM ANLYZR: CPT | Performed by: EMERGENCY MEDICINE

## 2024-07-27 RX ORDER — ATOMOXETINE 25 MG/1
100 CAPSULE ORAL EVERY MORNING
Status: DISCONTINUED | OUTPATIENT
Start: 2024-07-27 | End: 2024-07-31 | Stop reason: HOSPADM

## 2024-07-27 RX ORDER — QUETIAPINE FUMARATE 100 MG/1
200 TABLET, FILM COATED ORAL AT BEDTIME
Status: DISCONTINUED | OUTPATIENT
Start: 2024-07-27 | End: 2024-07-29

## 2024-07-27 RX ORDER — OLANZAPINE 5 MG/1
10 TABLET ORAL 2 TIMES DAILY
Status: DISCONTINUED | OUTPATIENT
Start: 2024-07-27 | End: 2024-07-27

## 2024-07-27 RX ORDER — ACETAMINOPHEN 325 MG/1
650 TABLET ORAL EVERY 4 HOURS PRN
Status: DISCONTINUED | OUTPATIENT
Start: 2024-07-27 | End: 2024-07-31 | Stop reason: HOSPADM

## 2024-07-27 RX ORDER — PROPRANOLOL HYDROCHLORIDE 10 MG/1
10 TABLET ORAL 2 TIMES DAILY
Status: DISCONTINUED | OUTPATIENT
Start: 2024-07-27 | End: 2024-07-27

## 2024-07-27 RX ORDER — LITHIUM CARBONATE 300 MG/1
600 TABLET, FILM COATED, EXTENDED RELEASE ORAL AT BEDTIME
Status: DISCONTINUED | OUTPATIENT
Start: 2024-07-27 | End: 2024-07-31 | Stop reason: HOSPADM

## 2024-07-27 RX ORDER — GABAPENTIN 300 MG/1
300 CAPSULE ORAL 3 TIMES DAILY
Status: DISCONTINUED | OUTPATIENT
Start: 2024-07-27 | End: 2024-07-31 | Stop reason: HOSPADM

## 2024-07-27 RX ORDER — ACYCLOVIR 400 MG/1
400 TABLET ORAL 2 TIMES DAILY
Status: DISCONTINUED | OUTPATIENT
Start: 2024-07-27 | End: 2024-07-31 | Stop reason: HOSPADM

## 2024-07-27 RX ORDER — BENZTROPINE MESYLATE 0.5 MG/1
0.5 TABLET ORAL DAILY
Status: DISCONTINUED | OUTPATIENT
Start: 2024-07-27 | End: 2024-07-31 | Stop reason: HOSPADM

## 2024-07-27 RX ORDER — LEVETIRACETAM 250 MG/1
250 TABLET ORAL 2 TIMES DAILY
Status: DISCONTINUED | OUTPATIENT
Start: 2024-07-27 | End: 2024-07-27

## 2024-07-27 RX ORDER — PROPRANOLOL HYDROCHLORIDE 10 MG/1
10 TABLET ORAL 2 TIMES DAILY
Status: DISCONTINUED | OUTPATIENT
Start: 2024-07-27 | End: 2024-07-31 | Stop reason: HOSPADM

## 2024-07-27 RX ORDER — ACYCLOVIR 400 MG/1
400 TABLET ORAL 2 TIMES DAILY
Status: DISCONTINUED | OUTPATIENT
Start: 2024-07-27 | End: 2024-07-27

## 2024-07-27 RX ORDER — LEVETIRACETAM 1000 MG/1
1000 TABLET ORAL 2 TIMES DAILY
Status: DISCONTINUED | OUTPATIENT
Start: 2024-07-27 | End: 2024-07-27

## 2024-07-27 RX ADMIN — LEVETIRACETAM 1250 MG: 500 TABLET, FILM COATED ORAL at 18:19

## 2024-07-27 RX ADMIN — LITHIUM CARBONATE 600 MG: 300 TABLET, EXTENDED RELEASE ORAL at 21:10

## 2024-07-27 RX ADMIN — BENZTROPINE MESYLATE 0.5 MG: 0.5 TABLET ORAL at 18:19

## 2024-07-27 RX ADMIN — QUETIAPINE FUMARATE 200 MG: 100 TABLET ORAL at 21:10

## 2024-07-27 RX ADMIN — GABAPENTIN 300 MG: 300 CAPSULE ORAL at 21:09

## 2024-07-27 RX ADMIN — PROPRANOLOL HYDROCHLORIDE 10 MG: 10 TABLET ORAL at 18:22

## 2024-07-27 RX ADMIN — ATOMOXETINE 100 MG: 25 CAPSULE ORAL at 18:20

## 2024-07-27 RX ADMIN — ACYCLOVIR 400 MG: 400 TABLET ORAL at 18:19

## 2024-07-27 ASSESSMENT — ENCOUNTER SYMPTOMS
BACK PAIN: 1
WEAKNESS: 0
NECK PAIN: 1
NUMBNESS: 0
HALLUCINATIONS: 0
ABDOMINAL PAIN: 0

## 2024-07-27 ASSESSMENT — ACTIVITIES OF DAILY LIVING (ADL)
ADLS_ACUITY_SCORE: 35

## 2024-07-27 ASSESSMENT — COLUMBIA-SUICIDE SEVERITY RATING SCALE - C-SSRS
6. HAVE YOU EVER DONE ANYTHING, STARTED TO DO ANYTHING, OR PREPARED TO DO ANYTHING TO END YOUR LIFE?: NO
1. IN THE PAST MONTH, HAVE YOU WISHED YOU WERE DEAD OR WISHED YOU COULD GO TO SLEEP AND NOT WAKE UP?: NO
2. HAVE YOU ACTUALLY HAD ANY THOUGHTS OF KILLING YOURSELF IN THE PAST MONTH?: NO

## 2024-07-27 NOTE — ED PROVIDER NOTES
[facility] EMERGENCY DEPARTMENT   ED Mental Health Observation - Initiation Note    Edward Resendez was placed into observation at 1:47 PM on 7/27/2024 for Psychosis paranoia and homicidal ideation..   Patient is expected to be under observation status for a minimum of eight hours.    Currently pending DEC evaluation.      I discussed with pharmacy medications have been ordered.      Patient has been cooperative.      He is currently voluntary but holdable.    MD Stacy Hurst Lucy S, MD  07/27/24 9648
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
## 2024-07-27 NOTE — ED NOTES
7/27/2024  Edward Resendez 2000     Writer consulted with ED provider, ED staff,  on this date at 1:05 pm. It was determined that pt would not benefit from assessment at this time due to ED not able to answer [facility] call.  tried three times and spoke with ED staff twice and they were unable to fix the issue.         DEC order has been closed at this time.    KARTHIKEYAN Garcia
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
## 2024-07-27 NOTE — PROGRESS NOTES
IP MH Referral Acuity Rating Score (RARS)    LMHP complete at referral to IP MH, with DEC; and, daily while awaiting IP MH placement. Call score to PPS.  CRITERIA SCORING   New 71 HH and Involuntary for IP MH (not adolescent) 0/1   Boarding over 24 hours 0/1   Vulnerable adult at least 55+ with multiple co morbidities; or, Patient age 11 or under 0/1   Suicide ideation without relief of precipitating factors 0/1   Current plan for suicide 0/1   Current plan for homicide 0/1   Imminent risk or actual attempt to seriously harm another without relief of factors precipitating the attempt 1/1   Severe dysfunction in daily living (ex: complete neglect for self care, extreme disruption in vegetative function, extreme deterioration in social interactions) 1/1   Recent (last 2 weeks) or current physical aggression in the ED 0/1   Restraints or seclusion episode in ED 0/1   Verbal aggression, agitation, yelling, etc., while in the ED 0/1   Active psychosis with psychomotor agitation or catatonia 0/1   Need for constant or near constant redirection (from leaving, from others, etc).  0/1   Intrusive or disruptive behaviors 0/1   TOTAL Acuity Total Score: 2    Mo ALESIA Santoyo, [facility]  7/27/2024  3:45 PM
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
## 2024-07-27 NOTE — ED NOTES
MD did not cancel DEC assessment.  Writer had Ipad in front and ready to answer.  There was not any call in from .

## 2024-07-27 NOTE — PHARMACY-ADMISSION MEDICATION HISTORY
Pharmacy Intern Admission Medication History    Admission medication history is complete. The information provided in this note is only as accurate as the sources available at the time of the update.    Information Source(s): Patient and CareEverywhere/SureScripts via in-person    Pertinent Information: Patient asked for meds for anger and dyslexia. He is also asking for his acyclovir to be increased.     Changes made to PTA medication list:  Added: None  Deleted: None  Changed: None    Allergies reviewed with patient and updates made in EHR: yes    Medication History Completed By: Gael DASILVA 7/27/2024 11:59 AM    PTA Med List   Medication Sig Last Dose    acyclovir (ZOVIRAX) 400 MG tablet Take 400 mg by mouth 2 times daily 7/26/2024 at pm    atomoxetine (STRATTERA) 100 MG capsule Take 1 capsule by mouth every morning 7/26/2024 at am    benztropine (COGENTIN) 0.5 MG tablet Take 1 tablet by mouth daily 7/26/2024 at am    cholecalciferol (VITAMIN D3) 25 mcg (1000 units) capsule Take 1,000 Units by mouth daily 7/26/2024 at am    diclofenac (VOLTAREN) 1 % topical gel Apply 2 g topically daily as needed for moderate pain Past Month    gabapentin (NEURONTIN) 300 MG capsule Take 300 mg by mouth 3 times daily 7/26/2024 at pm    levETIRAcetam (KEPPRA) 1000 MG tablet Take 1,000 mg by mouth 2 times daily 7/26/2024 at pm    levETIRAcetam (KEPPRA) 250 MG tablet Take 250 mg by mouth 2 times daily 7/26/2024 at pm    lithium ER (LITHOBID) 300 MG CR tablet Take 600 mg by mouth at bedtime 7/26/2024 at hs    propranolol (INDERAL) 10 MG tablet Take 10 mg by mouth 2 times daily 7/26/2024 at pm    QUEtiapine (SEROQUEL) 200 MG tablet Take 200 mg by mouth at bedtime 7/26/2024 at hs
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
## 2024-07-28 ENCOUNTER — TELEPHONE (OUTPATIENT)
Dept: BEHAVIORAL HEALTH | Facility: CLINIC | Age: 24
End: 2024-07-28
Payer: COMMERCIAL

## 2024-07-28 PROCEDURE — 250N000013 HC RX MED GY IP 250 OP 250 PS 637: Performed by: EMERGENCY MEDICINE

## 2024-07-28 RX ADMIN — PROPRANOLOL HYDROCHLORIDE 10 MG: 10 TABLET ORAL at 07:55

## 2024-07-28 RX ADMIN — GABAPENTIN 300 MG: 300 CAPSULE ORAL at 20:02

## 2024-07-28 RX ADMIN — ACYCLOVIR 400 MG: 400 TABLET ORAL at 20:05

## 2024-07-28 RX ADMIN — LITHIUM CARBONATE 600 MG: 300 TABLET, EXTENDED RELEASE ORAL at 22:00

## 2024-07-28 RX ADMIN — LEVETIRACETAM 1250 MG: 500 TABLET, FILM COATED ORAL at 20:04

## 2024-07-28 RX ADMIN — ACYCLOVIR 400 MG: 400 TABLET ORAL at 07:56

## 2024-07-28 RX ADMIN — ATOMOXETINE 100 MG: 25 CAPSULE ORAL at 07:56

## 2024-07-28 RX ADMIN — BENZTROPINE MESYLATE 0.5 MG: 0.5 TABLET ORAL at 07:55

## 2024-07-28 RX ADMIN — GABAPENTIN 300 MG: 300 CAPSULE ORAL at 07:55

## 2024-07-28 RX ADMIN — LEVETIRACETAM 1250 MG: 500 TABLET, FILM COATED ORAL at 07:54

## 2024-07-28 RX ADMIN — QUETIAPINE FUMARATE 200 MG: 100 TABLET ORAL at 22:00

## 2024-07-28 RX ADMIN — GABAPENTIN 300 MG: 300 CAPSULE ORAL at 14:46

## 2024-07-28 RX ADMIN — PROPRANOLOL HYDROCHLORIDE 10 MG: 10 TABLET ORAL at 20:10

## 2024-07-28 NOTE — ED NOTES
"Patient refusing breakfast tray, nurse went in with am meds, took all meds without concern. When asked if he slept well during night, no answer, did states he is at a treatment facility, and with any other questions stated \"that's it I am done answering questions\" turned back to nurse. Is on continiual video watch. Belongings placed in secure location by security   "

## 2024-07-28 NOTE — ED NOTES
Patient offered TV remote, declined. Is intermittedly standing at door looking out window. Will open door up slightly every so often but no attempt to leave. He is lying in bed eyes closed currently

## 2024-07-28 NOTE — TELEPHONE ENCOUNTER
R: [place]  Access Inpatient Bed Call Log 7/28/24 @3:20 PM:      Intake has called facilities that have not updated the bed status within the last 12 hours.                                   [facility] is posting 0 beds.            [facility] is posting 0 beds. [phone];  per call at 7:04 am to [facility], they are at cap.     [facility] is posting 0 beds. Negative covid required    [facility] is posting 0 beds. Neg covid. No high school/[facility]. [phone].  Per call at 7:09 am to [facility], they are at cap.     [facility] is posting 0 beds. [phone]    [facility] is posting 0 beds. [phone]     [facility] is posting 0 beds. Ages 18-35. Negative covid. [phone].  [facility] is posting 0 beds.     [facility] is posting 0 beds. Low acuity. [phone]    Pt remains on the work list pending appropriate bed availability.

## 2024-07-28 NOTE — TELEPHONE ENCOUNTER
R:  [place]  Access Inpatient Bed Call Log 7/27/24 @ 11:30PM  Intake has called facilities that have not updated their bed status within the last 12 hours.    [facility]     [facility]: @ cap  [facility]: @ cap per website   [facility]: @ cap per website   [facility]: @ cap per website. Per Romy @ 11:39PM, they are full tonight  [facility]: @ cap per website   [facility]: @ cap per website   [facility]: @ cap per website (Young Adult unit: No recent aggressions, violence or sexual assault. Neg covid required).     [facility]: @ cap per website   [facility]: @ cap per website   [facility]: Posting 1 bed. Per Frandy @ 11:42PM, no beds tonight    Pt remains on work list until appropriate placement is available

## 2024-07-28 NOTE — PROGRESS NOTES
Writer checked in on patient, he said he was fine and didn't need anything. He laid back down.Continues to rest in bed.
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
## 2024-07-28 NOTE — TELEPHONE ENCOUNTER
R:  No beds within [facility].   Pt is voluntary and agreeable to a Metro Bed search.  Metro Bed search initiated @ 8am:    [facility] is posting 0 beds. [phone];  per call at 7:04 am to [facility], they are at cap.    [facility] is posting 0 beds. Negative covid required   [facility] is posting 0 beds. Neg covid. No high school/[facility]. [phone].  Per call at 7:09 am to [facility], they are at cap.    [facility] is posting 0 beds. [phone]   [facility] is posting 0 beds. [phone]    [facility] is posting 0 beds. Ages 18-35. Negative covid. [phone]. Per call at 7:10 am, left a  asking for a call back re: bed avail.    [facility] is posting 0 beds.    [facility] is posting 0 beds. Low acuity. [phone]       Pt remains on the work list pending appropriate bed availability.

## 2024-07-29 ENCOUNTER — TELEPHONE (OUTPATIENT)
Dept: BEHAVIORAL HEALTH | Facility: CLINIC | Age: 24
End: 2024-07-29
Payer: COMMERCIAL

## 2024-07-29 PROCEDURE — 250N000013 HC RX MED GY IP 250 OP 250 PS 637: Performed by: EMERGENCY MEDICINE

## 2024-07-29 PROCEDURE — 250N000013 HC RX MED GY IP 250 OP 250 PS 637: Performed by: REGISTERED NURSE

## 2024-07-29 PROCEDURE — 99284 EMERGENCY DEPT VISIT MOD MDM: CPT | Performed by: REGISTERED NURSE

## 2024-07-29 RX ORDER — QUETIAPINE FUMARATE 100 MG/1
200 TABLET, FILM COATED ORAL ONCE
Status: COMPLETED | OUTPATIENT
Start: 2024-07-29 | End: 2024-07-29

## 2024-07-29 RX ORDER — OLANZAPINE 5 MG/1
5 TABLET ORAL 2 TIMES DAILY PRN
Status: DISCONTINUED | OUTPATIENT
Start: 2024-07-29 | End: 2024-07-31 | Stop reason: HOSPADM

## 2024-07-29 RX ORDER — OLANZAPINE 10 MG/2ML
5 INJECTION, POWDER, FOR SOLUTION INTRAMUSCULAR 2 TIMES DAILY PRN
Status: DISCONTINUED | OUTPATIENT
Start: 2024-07-29 | End: 2024-07-31 | Stop reason: HOSPADM

## 2024-07-29 RX ADMIN — QUETIAPINE FUMARATE 250 MG: 100 TABLET ORAL at 21:07

## 2024-07-29 RX ADMIN — QUETIAPINE FUMARATE 200 MG: 100 TABLET ORAL at 15:39

## 2024-07-29 RX ADMIN — PROPRANOLOL HYDROCHLORIDE 10 MG: 10 TABLET ORAL at 21:07

## 2024-07-29 RX ADMIN — GABAPENTIN 300 MG: 300 CAPSULE ORAL at 13:49

## 2024-07-29 RX ADMIN — GABAPENTIN 300 MG: 300 CAPSULE ORAL at 21:07

## 2024-07-29 RX ADMIN — ACYCLOVIR 400 MG: 400 TABLET ORAL at 07:38

## 2024-07-29 RX ADMIN — LEVETIRACETAM 1250 MG: 500 TABLET, FILM COATED ORAL at 21:08

## 2024-07-29 RX ADMIN — ATOMOXETINE 100 MG: 25 CAPSULE ORAL at 07:38

## 2024-07-29 RX ADMIN — ACYCLOVIR 400 MG: 400 TABLET ORAL at 21:08

## 2024-07-29 RX ADMIN — LEVETIRACETAM 1250 MG: 500 TABLET, FILM COATED ORAL at 07:37

## 2024-07-29 RX ADMIN — OLANZAPINE 5 MG: 5 TABLET, FILM COATED ORAL at 13:49

## 2024-07-29 RX ADMIN — LITHIUM CARBONATE 600 MG: 300 TABLET, EXTENDED RELEASE ORAL at 22:42

## 2024-07-29 RX ADMIN — BENZTROPINE MESYLATE 0.5 MG: 0.5 TABLET ORAL at 07:38

## 2024-07-29 RX ADMIN — ACETAMINOPHEN 650 MG: 325 TABLET ORAL at 23:21

## 2024-07-29 RX ADMIN — PROPRANOLOL HYDROCHLORIDE 10 MG: 10 TABLET ORAL at 07:38

## 2024-07-29 RX ADMIN — GABAPENTIN 300 MG: 300 CAPSULE ORAL at 07:37

## 2024-07-29 NOTE — ED NOTES
Patient is restless and states the air isn't working in the room..  I told him that he could leave the door open a little bit but that he needs to stay in the room.  He hovers near the door and stares out the window of the door.  He has asked a few times when he will get to leave.

## 2024-07-29 NOTE — ED NOTES
Patient asked for something to do because he wants to leave.  I printed a bunch of word searches and crossword puzzles off for him to do. Gave them to patient and he threw them on the floor and then crumpled them up and threw them in the garbage.  He came out of his room and said he did not like the stuff I printed for him and he wanted a list of our rules.

## 2024-07-29 NOTE — ED NOTES
Pt arguing with staff. Pt is stating that there is no reason for him to stay.  Pt states that he had a mental health eval and that we can't hold him.  Explained that the dr from last night stated that he is holdable and that he can't leave.  Security contacted to stay outside until dr sees the pt.  Dr Cummings made aware of the situation and will talk to him shortly.

## 2024-07-29 NOTE — ED NOTES
Patient slowly trying to leave his room.  I asked him to get back in his room and he said no.  Show me the rule that states I have to be in my room.  He said go get my stuff because I am going to leave.  Nurse interjected and told him to get in his room or she would call security.  He went back in his room.  He is still standing by the door with his arms crossed and staring out the window.

## 2024-07-29 NOTE — ED NOTES
Stuff brought from facility for pt.  Looks to be meds.  Copy of paperwork and what is in there is in chart.  Meds to be given to pharmacy to store.
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
## 2024-07-29 NOTE — ED PROVIDER NOTES
[facility] EMERGENCY DEPARTMENT   ED Mental Health Observation - Daily Note for 7/29/2024    Edward Resendez is a 24 year old male currently boarding in the ED while awaiting placement for Mental health crisis.  Please see the initial H&P for this patient's presentation, workup, and disposition plan.     Hold Status:  Patient is Voluntary, but holdable    Plan:  In brief, the patient's presentation is notable for psychosis, aggressive behavior.   Patient is awaiting Mental health placement      Physical Exam:  /68   Pulse 74   Temp 98.4  F (36.9  C) (Oral)   Resp 18   Ht 1.829 m (6')   Wt 103.1 kg (227 lb 4.7 oz)   SpO2 98%   BMI 30.83 kg/m    No respiratory distress, on room air   Well perfused  Behavior appropriate    Medications provided prior to my care:  Medications   acetaminophen (TYLENOL) tablet 650 mg (has no administration in time range)   atomoxetine (STRATTERA) capsule 100 mg (100 mg Oral $Given 7/29/24 0738)   benztropine (COGENTIN) tablet 0.5 mg (0.5 mg Oral $Given 7/29/24 0738)   gabapentin (NEURONTIN) capsule 300 mg (300 mg Oral $Given 7/29/24 1349)   lithium ER (LITHOBID) CR tablet 600 mg (600 mg Oral $Given 7/28/24 2200)   levETIRAcetam (KEPPRA) tablet 1,250 mg (1,250 mg Oral $Given 7/29/24 0737)   acyclovir (ZOVIRAX) tablet 400 mg (400 mg Oral $Given 7/29/24 0738)   propranolol (INDERAL) tablet 10 mg (10 mg Oral $Given 7/29/24 0738)   QUEtiapine (SEROquel) tablet 250 mg (has no administration in time range)   OLANZapine (zyPREXA) injection 5 mg ( Intramuscular See Alternative 7/29/24 1349)     Or   OLANZapine (zyPREXA) tablet 5 mg (5 mg Oral $Given 7/29/24 1349)       Laboratory (reviewed and interpreted):  Labs Ordered and Resulted from Time of ED Arrival to Time of ED Departure   COMPREHENSIVE METABOLIC PANEL - Abnormal       Result Value    Sodium 141      Potassium 4.2      Carbon Dioxide (CO2) 23      Anion Gap 11      Urea Nitrogen 11.6       Creatinine 1.00      GFR Estimate >90      Calcium 9.4      Chloride 107      Glucose 107 (*)     Alkaline Phosphatase 79      AST 32      ALT 79 (*)     Protein Total 7.3      Albumin 4.7      Bilirubin Total 0.4     ROUTINE UA WITH MICROSCOPIC REFLEX TO CULTURE - Abnormal    Color Urine Light Yellow      Appearance Urine Clear      Glucose Urine Negative      Bilirubin Urine Negative      Ketones Urine Negative      Specific Gravity Urine 1.020      Blood Urine Negative      pH Urine 6.5      Protein Albumin Urine Negative      Urobilinogen Urine <2.0      Nitrite Urine Negative      Leukocyte Esterase Urine Negative      Mucus Urine Present (*)     RBC Urine <1      WBC Urine <1     CBC WITH PLATELETS - Normal    WBC Count 6.5      RBC Count 5.19      Hemoglobin 16.0      Hematocrit 47.1      MCV 91      MCH 30.8      MCHC 34.0      RDW 12.6      Platelet Count 219     URINE DRUG SCREEN PANEL - Normal    Amphetamines Urine Screen Negative      Barbituates Urine Screen Negative      Benzodiazepine Urine Screen Negative      Cannabinoids Urine Screen Negative      Cocaine Urine Screen Negative      Fentanyl Qual Urine Screen Negative      Opiates Urine Screen Negative      PCP Urine Screen Negative         ED Course:  1:25 PM Psych NP, Melissa, made some additional medication changes.  Continues to recommend inpatient psych. Pt remains vol/holdable.    3:03 PM Pt now asking to leave. Explained reasons for  admission and what we're doing, but clearly psychotic and unable to redirect. Will place on 72 hour hold.  Pt advised.    3:03 PM Pt signed out to Dr. Patel pending  admission.    Impression/Plan:  1. Paranoia (H)    2. Homicidal ideation    3. Aggressive behavior    4. Acute psychosis (H)        DO Emiliano Ireland Daniel E, MD  07/29/24 9051

## 2024-07-29 NOTE — ED NOTES
Pt's civil commitment  Lou WINSTON called and stated that she sent a fax about revoking his commitment.  Told her that I would look for it.

## 2024-07-29 NOTE — ED NOTES
Lou WINSTON civil commitment   [phone].  Leave message when they figure out where he is going.

## 2024-07-29 NOTE — TELEPHONE ENCOUNTER
R:  No beds within [facility].    Metro bed search initiated @ 8:45am    [facility] is posting 0 beds. [phone]; per call at 7:06 am to [facility], they are at cap.    [facility] is posting 0 beds. Negative covid required   [facility] is posting 0 beds. Neg covid. No high school/[facility]. [phone].  per call at 7:07 am to Peg, they are at cap   [facility] is posting 0 beds. [phone]   [facility] is posting 0 beds. [phone]    [facility] is posting 0 beds. Ages 18-35. Negative covid. [phone]. Per call at 7:09 am to [facility], they have a couple y/a beds avail, a handful of adol, no child beds and no silvia beds avail.    [facility] is posting 0 beds.    [facility] is posting 0 beds. Low acuity. [phone]     Pt remains on the work list pending appropriate bed availability.
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
## 2024-07-29 NOTE — ED NOTES
Pt asked to speak to the nurse because he wants to leave.  Explained that he was on a hold and that he would have to stay.  Explained that the dr yesterday stated that he was holdable if he decided to leave.  I offered to show him that and he said we would make it up.  Explained that I will talk to the dr and they would talk to him.  Pt told that if he does try to leave that security would be called.  Explained that we are here to help him and that we were looking for a room for him someplace.

## 2024-07-29 NOTE — TELEPHONE ENCOUNTER
5:57 PM Paged Katy to review for unit 6A.         R: [place]  Access Inpatient Bed Call Log 7/29/24 @5:35 PM:     Intake has called facilities that have not updated the bed status within the last 12 hours.                               [facility] is posting 0 beds.           [facility] is posting 0 beds. [phone]; per call at 7:06 am to [facility], they are at cap.    [facility] is posting 0 beds. Negative covid required   [facility] is posting 0 beds. Neg covid. No high school/[facility]. [phone].  per call at 7:07 am to [facility], they are at cap but will have a couple d/c's later in the day.    [facility] is posting 0 beds. [phone]   [facility] is posting 0 beds. [phone]    [facility] is posting 0 beds. Ages 18-35. Negative covid. [phone]. Per call at 7:09 am to [facility], they have a couple y/a beds avail, a handful of adol, no child beds and no silvia beds avail.    [facility] is posting 0 beds.    [facility] is posting 0 beds. Low acuity. [phone]     Pt remains on the work list pending appropriate bed availability.

## 2024-07-29 NOTE — TELEPHONE ENCOUNTER
R:  [place]  Access Inpatient Bed Call Log 7/28/24 @ 11:30PM  Intake has called facilities that have not updated their bed status within the last 12 hours.    METRO     [facility]: @ cap  [facility]: @ cap per website   [facility]: @ cap per website   [facility]: @ cap per website. Per Romy @ 11:31PM, they are full  [facility]: @ cap per website   [facility]: @ cap per website   [facility]: @ cap per website (Young Adult unit: No recent aggressions, violence or sexual assault. Neg covid required).     [facility]: @ cap per website   [facility]: @ cap per website   [facility]: @ cap per website     Pt remains on work list until appropriate placement is available

## 2024-07-29 NOTE — ED NOTES
Through out shift is asking for his belongings and phone from his group home. He is reminded that he will get his belongings at the appropriate time and they are secured here for his safety.

## 2024-07-29 NOTE — ED PROVIDER NOTES
Patient has been seen by social work and they recommend current ED observation while they look for inpatient mental health services.  We will continue on his 72-hour hold.     Mackenzie Patel MD  07/29/24 4135

## 2024-07-29 NOTE — ED NOTES
Patient woke up and has been standing at the door looking out.  He opened the door at one point and asked to have his stuff and treatment.  We told him to go back in his room and we would have the nurse come in and talk to him.  He went to the bathroom and locked the door before we could tell him not to lock the door.  He finished and went back in his room and sat on the bed and crossed his arms.  He then came out and asked for a pad of paper and pen.  Tech brought in his dinner and nurse brought in a pad of paper and crayons.

## 2024-07-30 ENCOUNTER — TELEPHONE (OUTPATIENT)
Dept: BEHAVIORAL HEALTH | Facility: CLINIC | Age: 24
End: 2024-07-30
Payer: COMMERCIAL

## 2024-07-30 LAB — LITHIUM SERPL-SCNC: 0.94 MMOL/L (ref 0.6–1.2)

## 2024-07-30 PROCEDURE — 80178 ASSAY OF LITHIUM: CPT | Performed by: REGISTERED NURSE

## 2024-07-30 PROCEDURE — 36415 COLL VENOUS BLD VENIPUNCTURE: CPT | Performed by: REGISTERED NURSE

## 2024-07-30 PROCEDURE — 250N000013 HC RX MED GY IP 250 OP 250 PS 637: Performed by: EMERGENCY MEDICINE

## 2024-07-30 PROCEDURE — 250N000013 HC RX MED GY IP 250 OP 250 PS 637: Performed by: REGISTERED NURSE

## 2024-07-30 RX ADMIN — BENZTROPINE MESYLATE 0.5 MG: 0.5 TABLET ORAL at 09:42

## 2024-07-30 RX ADMIN — ATOMOXETINE 100 MG: 25 CAPSULE ORAL at 09:40

## 2024-07-30 RX ADMIN — LEVETIRACETAM 1250 MG: 500 TABLET, FILM COATED ORAL at 09:41

## 2024-07-30 RX ADMIN — PROPRANOLOL HYDROCHLORIDE 10 MG: 10 TABLET ORAL at 20:58

## 2024-07-30 RX ADMIN — LITHIUM CARBONATE 600 MG: 300 TABLET, EXTENDED RELEASE ORAL at 21:57

## 2024-07-30 RX ADMIN — GABAPENTIN 300 MG: 300 CAPSULE ORAL at 09:40

## 2024-07-30 RX ADMIN — OLANZAPINE 5 MG: 5 TABLET, FILM COATED ORAL at 15:09

## 2024-07-30 RX ADMIN — QUETIAPINE FUMARATE 250 MG: 100 TABLET ORAL at 21:04

## 2024-07-30 RX ADMIN — PROPRANOLOL HYDROCHLORIDE 10 MG: 10 TABLET ORAL at 09:41

## 2024-07-30 RX ADMIN — GABAPENTIN 300 MG: 300 CAPSULE ORAL at 14:18

## 2024-07-30 RX ADMIN — GABAPENTIN 300 MG: 300 CAPSULE ORAL at 20:58

## 2024-07-30 RX ADMIN — ACYCLOVIR 400 MG: 400 TABLET ORAL at 20:58

## 2024-07-30 RX ADMIN — QUETIAPINE FUMARATE 25 MG: 25 TABLET ORAL at 09:44

## 2024-07-30 RX ADMIN — LEVETIRACETAM 1250 MG: 500 TABLET, FILM COATED ORAL at 20:58

## 2024-07-30 RX ADMIN — ACYCLOVIR 400 MG: 400 TABLET ORAL at 09:40

## 2024-07-30 NOTE — TELEPHONE ENCOUNTER
R: [place]  Access Inpatient Bed Call Log  7/30/24 @ 12:00am   Intake has called facilities that have not updated their bed status within the last 12 hours.     *METRO:  [place] [facility]: @ capacity.  [facility]: @ cap per website. Reporting no reviews overnight.  [place] [facility]: @ cap per website. Low acuity  [place] [facility]: @ cap per website. Low acuity only.  [place] -- [facility]: @ cap per website.  [facility]: @ cap per website.   [facility] beds: @ cap per website. Ages 18-35, Voluntary only, NO aggression/physical/sexual assault, violence hx or drug abuse, or psychosis. Negative Covid  [place] [facility]: @ cap per website.  [place] -- [facility]: @ cap per website.  [place] -- [facility]: @ cap per website. Do not review overnight.      Pt remains on waitlist pending appropriate placement availability

## 2024-07-30 NOTE — ED NOTES
When assessing patient. Pt nods or shake head. Pt tries to use hand movement for communication. Pt does not verbally respond to assessment questions. Pt took all AM meds & accepted breakfast.

## 2024-07-30 NOTE — PROGRESS NOTES
Triage & Transition Services, Extended Care     Edward Resendez  July 30, 2024      Edward is followed related to Aggression/Violence and Psychosis. Please see initial DEC Crisis Assessment completed for complete assessment information. Medical record is reviewed.  Around 9:40 am today, writer attempted to meet with Pt today for extended care follow up. Pt declined to be seen by writer.    Extended Care will follow and meet with patient/family/care team as able or requested.     Willi Fierro, [facility], [facility]   [phone]

## 2024-07-30 NOTE — ED NOTES
Received Notice of Intent to revoke provisional discharge from [facility]. Notified the provider of the information. A copy of notice provided to patient. A copy of notice also kept in pt's chart.

## 2024-07-30 NOTE — ED PROVIDER NOTES
[facility] EMERGENCY DEPARTMENT   ED Mental Health Observation - Daily Note for 7/30/2024    Edward Resendez is a 24 year old male currently boarding in the ED while awaiting placement for Mental health crisis.  Please see the initial H&P for this patient's presentation, workup, and disposition plan.     Hold Status:  Patient is Voluntary, but holdable    Plan:  In brief, the patient's presentation is notable for psychosis  Patient is awaiting Mental health placement    Interim History:  There were no significant events since last note.    Physical Exam:  /75   Pulse 93   Temp 98.4  F (36.9  C) (Oral)   Resp 16   Ht 1.829 m (6')   Wt 103.1 kg (227 lb 4.7 oz)   SpO2 97%   BMI 30.83 kg/m    No respiratory distress, on room air   Well perfused  Behavior appropriate    Medications provided prior to my care:  Medications   acetaminophen (TYLENOL) tablet 650 mg (650 mg Oral $Given 7/29/24 2321)   atomoxetine (STRATTERA) capsule 100 mg (100 mg Oral $Given 7/29/24 0738)   benztropine (COGENTIN) tablet 0.5 mg (0.5 mg Oral $Given 7/29/24 0738)   gabapentin (NEURONTIN) capsule 300 mg (300 mg Oral $Given 7/29/24 2107)   lithium ER (LITHOBID) CR tablet 600 mg (600 mg Oral $Given 7/29/24 2242)   levETIRAcetam (KEPPRA) tablet 1,250 mg (1,250 mg Oral $Given 7/29/24 2108)   acyclovir (ZOVIRAX) tablet 400 mg (400 mg Oral $Given 7/29/24 2108)   propranolol (INDERAL) tablet 10 mg (10 mg Oral $Given 7/29/24 2107)   QUEtiapine (SEROquel) tablet 250 mg (250 mg Oral $Given 7/29/24 2107)   OLANZapine (zyPREXA) injection 5 mg ( Intramuscular See Alternative 7/29/24 1349)     Or   OLANZapine (zyPREXA) tablet 5 mg (5 mg Oral $Given 7/29/24 1349)   QUEtiapine (SEROquel) tablet 200 mg (200 mg Oral $Given 7/29/24 1539)       Laboratory (reviewed and interpreted):  Labs Ordered and Resulted from Time of ED Arrival to Time of ED Departure   COMPREHENSIVE METABOLIC PANEL - Abnormal       Result Value     Sodium 141      Potassium 4.2      Carbon Dioxide (CO2) 23      Anion Gap 11      Urea Nitrogen 11.6      Creatinine 1.00      GFR Estimate >90      Calcium 9.4      Chloride 107      Glucose 107 (*)     Alkaline Phosphatase 79      AST 32      ALT 79 (*)     Protein Total 7.3      Albumin 4.7      Bilirubin Total 0.4     ROUTINE UA WITH MICROSCOPIC REFLEX TO CULTURE - Abnormal    Color Urine Light Yellow      Appearance Urine Clear      Glucose Urine Negative      Bilirubin Urine Negative      Ketones Urine Negative      Specific Gravity Urine 1.020      Blood Urine Negative      pH Urine 6.5      Protein Albumin Urine Negative      Urobilinogen Urine <2.0      Nitrite Urine Negative      Leukocyte Esterase Urine Negative      Mucus Urine Present (*)     RBC Urine <1      WBC Urine <1     CBC WITH PLATELETS - Normal    WBC Count 6.5      RBC Count 5.19      Hemoglobin 16.0      Hematocrit 47.1      MCV 91      MCH 30.8      MCHC 34.0      RDW 12.6      Platelet Count 219     URINE DRUG SCREEN PANEL - Normal    Amphetamines Urine Screen Negative      Barbituates Urine Screen Negative      Benzodiazepine Urine Screen Negative      Cannabinoids Urine Screen Negative      Cocaine Urine Screen Negative      Fentanyl Qual Urine Screen Negative      Opiates Urine Screen Negative      PCP Urine Screen Negative     LITHIUM LEVEL - Normal    Lithium 0.94         ED Course:  0816 I met with the patient and discussed plan with care team.   9:47 AM  Spoke with Lou Castellanos with [facility]  who faxed over a revocation of discharge.  Will speak to Zachary Fierro, the extended care provider who has been seeing Edward to discuss and then we will let Edward know  Lou Castellanos cell phone: [phone]      Impression/Plan:  1. Paranoia (H)    2. Homicidal ideation    3. Aggressive behavior    4. Acute psychosis (H)        MD Nieves Barrera Anthony R, MD  07/30/24 0816       Ric Pickett MD  07/30/24  3018

## 2024-07-30 NOTE — ED NOTES
"Pt cooperative with taking nighttime medications.     Pt refuses to converse with RN, only shaking his head \"yes\" and \"no\" at this time.  "

## 2024-07-30 NOTE — TELEPHONE ENCOUNTER
R:  [place]  Access Inpatient Bed Call Log 7/30/2024  @4:20 PM:  Intake has called facilities that have not updated their bed status within the last 12 hours.                [facility] is posting 0 beds.                        [facility] is posting 0 beds. [phone], APS: [phone] 8:11 AM FULL.            [facility] is posting 0 beds. [phone]                        [facility] is posting 0 beds. [phone] 8:12AM PER MARY ANNE, AT CAPACITY.              [facility] is posting 0 beds. [phone]             [facility] (Ages 18-35) is posting 2 beds. Negative COVID test required, no recent or significant aggression, violence, or sexual assault. [phone]            [facility] is posting 0 beds. [phone]                      [facility] is posting 0 beds. [phone]               [facility] is posting 0 beds. [phone]    Pt remains on work list pending appropriate bed availability.

## 2024-07-30 NOTE — TELEPHONE ENCOUNTER
R: [facility] Access Inpatient Bed Call Log 7/30/2024 8:06:07 AM    Intake has called facilities that have not updated their bed status within the last 12 hours.    ADULTS:  *[facility] is posting 0 beds.              [facility] is posting 0 beds. [phone], APS: [phone] 8:11 AM FULL.  [facility] is posting 0 beds. [phone]              [facility] is posting 0 beds. [phone] 8:12AM PER MARY ANNE, AT CAPACITY.    [facility] is posting 0 beds. [phone]   [facility] (Ages 18-35) is posting 2 beds. Negative COVID test required, no recent or significant aggression, violence, or sexual assault. [phone]   [facility] is posting 0 beds. [phone]            [facility] is posting 0 beds. [phone]     [facility] is posting 0 beds. [phone]        Pt remains on work list pending appropriate bed availability.

## 2024-07-30 NOTE — ED NOTES
IP MH Referral Acuity Rating Score (RARS)    LMHP complete at referral to IP MH, with DEC; and, daily while awaiting IP MH placement. Call score to PPS.  CRITERIA SCORING   New 72 HH and Involuntary for IP MH (not adolescent) 0/1   Boarding over 24 hours 1/1   Vulnerable adult at least 55+ with multiple co morbidities; or, Patient age 11 or under 0/1   Suicide ideation without relief of precipitating factors 0/1   Current plan for suicide 0/1   Current plan for homicide 0/1   Imminent risk or actual attempt to seriously harm another without relief of factors precipitating the attempt 1/1   Severe dysfunction in daily living (ex: complete neglect for self care, extreme disruption in vegetative function, extreme deterioration in social interactions) 1/1   Recent (last 2 weeks) or current physical aggression in the ED 0/1   Restraints or seclusion episode in ED 0/1   Verbal aggression, agitation, yelling, etc., while in the ED 0/1   Active psychosis with psychomotor agitation or catatonia 1/1   Need for constant or near constant redirection (from leaving, from others, etc).  1/1   Intrusive or disruptive behaviors 0/1   TOTAL Acuity Total Score: 5

## 2024-07-30 NOTE — ED NOTES
"Pt comes out of his room and asks if he can have a visitor. I conferred with provider and we asked the pt whom he wanted to have visit. He reports he would like the police to come here to report what we are doing to him. I have offered a phone if pt wanted to contact the police to file a report and he states \"get out of here\". I have left his room. Pt has an angry looking face. He reports to another staff member that the doctor that he talked to earlier on the computer screen must've said something to me. As I had charted earlier, the pt has not had an interview with anyone on screen today.   "

## 2024-07-30 NOTE — TELEPHONE ENCOUNTER
R; PPS received via  07/30/2024 10:03:35 AM FINDINGS AND ORDER FOR CONTINUED COMMITMENT. Filed in Court/Commitment/Rose/Hold folder. PPS following notified.

## 2024-07-30 NOTE — ED NOTES
Pt has been standing in his doorway and will not go into his room and/or shut his door. Pt requires a group of security officers to stand by and talk with him. Pt is talking to the officers about reporting something breaking to police but he cannot tell them what broke. Pt eventually goes back into room and the officers are able to close the door.    Patient presented with abdominal pain.  Patient was complaining of a lot of pain when we evaluated patient.  Patient was insisting on getting pain medication as soon as possible.  Labs and imaging were ordered.  Patient was ordered for IV fluids and pain medication.  Patient was given pain medication.  Patient then eloped from the ED.  Patient left prior to getting lab or imaging results.  Patient also eloped prior to getting his CT scan and ultrasound.  I attempted to call patient multiple times but was unsuccessful in getting in touch with the patient.  Patient eloped from the ED.

## 2024-07-30 NOTE — TELEPHONE ENCOUNTER
R:    9:33a Received call from Extended Care/EC informing pt's  PD has been revoked and will fax to Intake.     Findings and Order for Continued Commitment received in [employer] on 07/30/2024 at 9:41:52 AM,  placed in [employer] Folder labeled Court/Commitment/Rose/Hold Paperwork folder.     1:48p Paged Psychiatry NP Lupe for review of pt, awaiting response.    2:13p Received call from Psychiatry NP Lupe informing pt is declined d/t pt acuity, pt not engaging and aggression, recommends [facility] at this time.

## 2024-07-30 NOTE — ED NOTES
Pt has finished all the food given to him on breakfast tray. Pt needs to void, walked to bathroom and back, voids, linen changed on bed, garbage cleared from room. Pt states that breakfast was fine and he says yes when I asked if it agreed with him. He denies nausea/stomach upset. I ask him to let me know when he will agree to talk with the DEC . Pt states that he won't talk to anyone until his 72 hr hold is up.

## 2024-07-30 NOTE — ED NOTES
Writer notified [facility] (endy Herrera) that patient has been provided with court papers, intent to revoke provisional discharge.

## 2024-07-30 NOTE — ED NOTES
Patient asked for remote to the tv.  RN brought him a remote he turned the tv on and off.  He then examined the remote for a couple minutes and then threw it on the floor in his room.  He never watched tv.

## 2024-07-30 NOTE — ED NOTES
"I brought the rolling laptop into the room with DEC  on the screen. Pt is sleeping, awakens easily to voice. Pt looks at the computer and states \"I don't want that\". I have explained we are trying to facilitate his care and transfer to a potential facility. I leave the laptop by his bed and as soon as the  begins to speak, the patient presses the exit the call button and rolls the laptop away from him.   "

## 2024-07-31 ENCOUNTER — TELEPHONE (OUTPATIENT)
Dept: BEHAVIORAL HEALTH | Facility: CLINIC | Age: 24
End: 2024-07-31
Payer: COMMERCIAL

## 2024-07-31 ENCOUNTER — HOSPITAL ENCOUNTER (INPATIENT)
Facility: CLINIC | Age: 24
LOS: 14 days | Discharge: NURSING, REHAB OR LONG-TERM CARE | End: 2024-08-14
Attending: PSYCHIATRY & NEUROLOGY | Admitting: PSYCHIATRY & NEUROLOGY
Payer: COMMERCIAL

## 2024-07-31 VITALS
WEIGHT: 227.29 LBS | DIASTOLIC BLOOD PRESSURE: 59 MMHG | SYSTOLIC BLOOD PRESSURE: 125 MMHG | HEIGHT: 72 IN | RESPIRATION RATE: 16 BRPM | BODY MASS INDEX: 30.79 KG/M2 | OXYGEN SATURATION: 98 % | HEART RATE: 83 BPM | TEMPERATURE: 97.9 F

## 2024-07-31 PROBLEM — F23 ACUTE PSYCHOSIS (H): Status: ACTIVE | Noted: 2024-07-31

## 2024-07-31 PROCEDURE — 250N000013 HC RX MED GY IP 250 OP 250 PS 637: Performed by: REGISTERED NURSE

## 2024-07-31 PROCEDURE — 250N000013 HC RX MED GY IP 250 OP 250 PS 637: Performed by: CLINICAL NURSE SPECIALIST

## 2024-07-31 PROCEDURE — 250N000013 HC RX MED GY IP 250 OP 250 PS 637: Performed by: EMERGENCY MEDICINE

## 2024-07-31 PROCEDURE — 124N000002 HC R&B MH UMMC

## 2024-07-31 PROCEDURE — 99207 PR NO BILLABLE SERVICE THIS VISIT: CPT | Performed by: REGISTERED NURSE

## 2024-07-31 RX ORDER — ATOMOXETINE 100 MG/1
100 CAPSULE ORAL EVERY MORNING
Status: DISCONTINUED | OUTPATIENT
Start: 2024-08-01 | End: 2024-08-14 | Stop reason: HOSPADM

## 2024-07-31 RX ORDER — LEVETIRACETAM 500 MG/1
1000 TABLET ORAL 2 TIMES DAILY
Status: DISCONTINUED | OUTPATIENT
Start: 2024-07-31 | End: 2024-08-14 | Stop reason: HOSPADM

## 2024-07-31 RX ORDER — LEVETIRACETAM 250 MG/1
250 TABLET ORAL 2 TIMES DAILY
Status: DISCONTINUED | OUTPATIENT
Start: 2024-07-31 | End: 2024-08-14 | Stop reason: HOSPADM

## 2024-07-31 RX ORDER — VITAMIN B COMPLEX
25 TABLET ORAL DAILY
Status: DISCONTINUED | OUTPATIENT
Start: 2024-08-01 | End: 2024-08-14 | Stop reason: HOSPADM

## 2024-07-31 RX ORDER — GABAPENTIN 300 MG/1
300 CAPSULE ORAL 3 TIMES DAILY
Status: DISCONTINUED | OUTPATIENT
Start: 2024-08-01 | End: 2024-08-14 | Stop reason: HOSPADM

## 2024-07-31 RX ORDER — TRAZODONE HYDROCHLORIDE 50 MG/1
50 TABLET, FILM COATED ORAL
Status: DISCONTINUED | OUTPATIENT
Start: 2024-07-31 | End: 2024-08-14 | Stop reason: HOSPADM

## 2024-07-31 RX ORDER — QUETIAPINE FUMARATE 200 MG/1
200 TABLET, FILM COATED ORAL AT BEDTIME
Status: DISCONTINUED | OUTPATIENT
Start: 2024-07-31 | End: 2024-08-14 | Stop reason: HOSPADM

## 2024-07-31 RX ORDER — PROPRANOLOL HYDROCHLORIDE 10 MG/1
10 TABLET ORAL 2 TIMES DAILY
Status: DISCONTINUED | OUTPATIENT
Start: 2024-07-31 | End: 2024-08-14 | Stop reason: HOSPADM

## 2024-07-31 RX ORDER — HYDROXYZINE HYDROCHLORIDE 25 MG/1
25 TABLET, FILM COATED ORAL EVERY 4 HOURS PRN
Status: DISCONTINUED | OUTPATIENT
Start: 2024-07-31 | End: 2024-08-14 | Stop reason: HOSPADM

## 2024-07-31 RX ORDER — ACYCLOVIR 400 MG/1
400 TABLET ORAL 2 TIMES DAILY
Status: DISCONTINUED | OUTPATIENT
Start: 2024-07-31 | End: 2024-08-02

## 2024-07-31 RX ORDER — LITHIUM CARBONATE 300 MG/1
600 TABLET, FILM COATED, EXTENDED RELEASE ORAL AT BEDTIME
Status: DISCONTINUED | OUTPATIENT
Start: 2024-07-31 | End: 2024-08-06

## 2024-07-31 RX ORDER — BENZTROPINE MESYLATE 0.5 MG/1
0.5 TABLET ORAL DAILY
Status: DISCONTINUED | OUTPATIENT
Start: 2024-08-01 | End: 2024-08-14 | Stop reason: HOSPADM

## 2024-07-31 RX ADMIN — QUETIAPINE FUMARATE 200 MG: 200 TABLET ORAL at 22:46

## 2024-07-31 RX ADMIN — LEVETIRACETAM 1000 MG: 500 TABLET, FILM COATED ORAL at 22:52

## 2024-07-31 RX ADMIN — PROPRANOLOL HYDROCHLORIDE 10 MG: 10 TABLET ORAL at 22:46

## 2024-07-31 RX ADMIN — GABAPENTIN 300 MG: 300 CAPSULE ORAL at 09:24

## 2024-07-31 RX ADMIN — ACYCLOVIR 400 MG: 400 TABLET ORAL at 09:25

## 2024-07-31 RX ADMIN — QUETIAPINE FUMARATE 25 MG: 25 TABLET ORAL at 09:24

## 2024-07-31 RX ADMIN — LEVETIRACETAM 1250 MG: 500 TABLET, FILM COATED ORAL at 09:28

## 2024-07-31 RX ADMIN — OLANZAPINE 5 MG: 5 TABLET, FILM COATED ORAL at 14:49

## 2024-07-31 RX ADMIN — ATOMOXETINE 100 MG: 25 CAPSULE ORAL at 09:26

## 2024-07-31 RX ADMIN — BENZTROPINE MESYLATE 0.5 MG: 0.5 TABLET ORAL at 09:25

## 2024-07-31 RX ADMIN — LEVETIRACETAM 250 MG: 250 TABLET, FILM COATED ORAL at 22:53

## 2024-07-31 RX ADMIN — LITHIUM CARBONATE 600 MG: 300 TABLET, EXTENDED RELEASE ORAL at 22:46

## 2024-07-31 RX ADMIN — PROPRANOLOL HYDROCHLORIDE 10 MG: 10 TABLET ORAL at 09:27

## 2024-07-31 RX ADMIN — GABAPENTIN 300 MG: 300 CAPSULE ORAL at 14:49

## 2024-07-31 RX ADMIN — ACYCLOVIR 400 MG: 400 TABLET ORAL at 22:53

## 2024-07-31 ASSESSMENT — ACTIVITIES OF DAILY LIVING (ADL)
ADLS_ACUITY_SCORE: 35
WEAR_GLASSES_OR_BLIND: NO
ADLS_ACUITY_SCORE: 35
ORAL_HYGIENE: INDEPENDENT
ADLS_ACUITY_SCORE: 28
ADLS_ACUITY_SCORE: 35
ADLS_ACUITY_SCORE: 28
LAUNDRY: UNABLE TO COMPLETE
CONCENTRATING,_REMEMBERING_OR_MAKING_DECISIONS_DIFFICULTY: NO
ADLS_ACUITY_SCORE: 28
HYGIENE/GROOMING: INDEPENDENT
HEARING_DIFFICULTY_OR_DEAF: NO
ADLS_ACUITY_SCORE: 35
ADLS_ACUITY_SCORE: 35
DRESS: INDEPENDENT
ADLS_ACUITY_SCORE: 28
ADLS_ACUITY_SCORE: 28
ADLS_ACUITY_SCORE: 35

## 2024-07-31 NOTE — CONSULTS
Psychiatry Consultation; Follow up     Psychiatry saw patient today for follow up. He was resting comfortably in bed in his ED room. He was in no acute distress and declined further intervention at this time. Per report and chart review, patient accepted to [facility] at [place] for inpatient psychiatry.         Page me or re-consult psychiatry as needed (psychiatry is signing off).       Comfort Mayer, KORTNEY, APRN  Consult/Liaison Psychiatry  [facility]   Contact information available via [facility] Paging/Directory.  If I am not available, please call [facility] intake ([phone])

## 2024-07-31 NOTE — ED PROVIDER NOTES
[facility] EMERGENCY DEPARTMENT   ED Mental Health Observation - Discharge Note (Brief)    Edward Resendez is boarding in the ED after undergoing evaluation for Mental health crisis  Please see the daily progress note for this patient's presentation, physical examination, and work up.    Upon reevaluation and discussion with DEC , we believe patient has shown insufficient improvement and thus will be Admitted.    Patient is exhibited ongoing paranoia, willingness to dissipate and cares, psychosis, been seen by psychiatry who recommended some dose adjustments and admission.  Extended care has been evaluated patient recommended admission    Patient is been medically cleared    Has been accepted for transfer to [place]    EMERGENCY DEPARTMENT OBSERVATION status ended at 3:14 PM 7/31/2024    Discharge Management Time: < 30 minutes    1. Paranoia (H)    2. Homicidal ideation    3. Aggressive behavior    4. Acute psychosis (H)        MD Karlene Villafana Steven J, MD  07/31/24 7161

## 2024-07-31 NOTE — ED NOTES
"Pt endorsing \"I'm just holding out for the end of my 72 hold\" refuses DEC still at this time. Does not want to talk to writer about feelings and situation. Reports to writer the mental health resources are poor in [place]. Difficult to obtain words and information from despite writers therapeutic efforts to provide pt w/ a safe and open space to discuss situation and plan. Pt ends conversation with \"okay I dont want to talk anymore\"  "

## 2024-07-31 NOTE — PROGRESS NOTES
"Triage & Transition Services, Extended Care     Therapy Progress Note    Patient: Edward goes by \"Edward,\" uses he/him pronouns  Date of Service: July 31, 2024  Site of Service: [facility] EMERGENCY DEPARTMENT                             [id number]  Patient was seen yes  Mode of Assessment: Virtual: AmWell    Presentation Summary: Pt is seen by extended care for therapeutic check-in and reassessment. Exchanged greeting, introduced self and role. Pt reports he is confused. He continues to exhibit bizarre thought content, paranoia, and delusional thinking. Pt is irritable and verbally combative. When asked about SI or HI he responds, \"This is of my own personal opinion.\" Pt asked, \"Why do you keep checking in? I don't care. How long till you say this is over.\" When asked if Pt is experiencing auditory hallucinations, he responds with, \"I am not smart enough. Put something on my head so I don't need to listen. My sex drive needs to be fixed. I last 2 seconds when I put my penis in a vagina.\" Also, Pt stated, \"I need someone to de-rubberize my body after it was rubberized 12 years ago.\"    Therapeutic Intervention(s) Provided: Engaged in guided discovery, explored patient's perspectives and helped expand them through socratic dialogue.    Current Symptoms: obsessions/compulsions irritable, impaired decision making   impulsive, high risk behavior, grandiosity, hostile/aggressive, anger      Mental Status Exam   Affect: Blunted  Appearance: Appropriate  Attention Span/Concentration: Attentive  Eye Contact: Intense    Fund of Knowledge: Appropriate   Language /Speech Content: Fluent  Language /Speech Volume: Normal  Language /Speech Rate/Productions: Minimally Responsive  Recent Memory: Variable  Remote Memory: Variable  Mood: Irritable  Orientation to Person: Yes   Orientation to Place: Yes  Orientation to Time of Day: Yes  Orientation to Date: No     Situation (Do they understand why they are " here?): Yes  Psychomotor Behavior: Normal  Thought Content: Hallucinations, Paranoia, Homicidal  Thought Form: Intact, Other (please comment) (erratic thoughts that are unrelated to the question or topic)    Treatment Objective(s) Addressed: rapport building, assessing safety    Patient Response to Interventions: acceptance expressed, needs reinforcement    Progress Towards Goals: Patient Reports Symptoms Are: ongoing  Patient Progress Toward Goals: is not making progress  Comment: Pt is a danger to self or others and unable to manage mental health safety in the community.  Next Step to Work Toward Discharge: symptom stabilization  Symptom Stabilization Comment: Pt requires inpatient mental health services. Pt is on a waitlist for IP services. Pt is on a 72hh and [facility] has filed to revoke provisional discharge.    Case Management: Case Management Included: collaborating with patient's support system  Details on Collaborating with Patient's Support System: Writer spoke with patient's , Lou Herrera, of [facility] [phone].    Summary of Interaction: Lou reports she filed an order to revoke the patient's provisional discharge.    Plan: inpatient mental health  yes provider, RN Notified ER provider of recommendation to continue with plan for inpatient mental health services.  yes    Clinical Substantiation: Pt is experiencing aggression, erratic thought content, paranoia, and delusional thinking. He is a danger to self and others. Insight and judgment impaired. After therapeutic assessment, intervention and aftercare planning by ED care team and LMHP and in consultation with the attending provider, the patient's circumstances and mental state were appropriate for inpatient behavioral health. Patient is recommended by this clinician to admit IP  for safety and stabilization.    Legal Status: Legal Status at Admission: 72 Hour Hold  72 Hour Hold - Date/Time Initiated: 7/29  1502  72 Hour Hold - Date/Time Ends: 8/1 1502    Session Status: Time session started: 1015  Time session ended: 1019  Session Duration (minutes): 4 minutes  Session Number: 1  Anticipated number of sessions or this episode of care: 4    Time Spent: 4 minutes    CPT Code: CPT Codes: Non-Billable    Diagnosis:   Patient Active Problem List   Diagnosis Code    Psychosis, unspecified psychosis type (H) F29    Intellectual disability F79       Primary Problem This Admission: Active Hospital Problems    Intellectual disability      *Psychosis, unspecified psychosis type (H)    F29      Willi Fierro, [facility]   Licensed Mental Health Professional (LMHP), [facility]  [phone]

## 2024-07-31 NOTE — TELEPHONE ENCOUNTER
S:  Station 12 is on a case by case basis.  BERNARDINO is reviewing pt.    R:  Admit to station 12    /  Lars Bowens accepts   Under commitment  and filed to revoke P.D.  on 72 also    -  12:37 PM  12 CRN informed  -  12:39 PM  ED informed    - 2:36 pm  Indicia done

## 2024-07-31 NOTE — ED NOTES
This writer spoke with [place] commitment screener Emmanuelle at around 4:15 PM, and she reports they have submitted intent to revoke paperwork and are waiting for the  to sign. She says there is a 5-day mandatory waiting period for the patient to contest this with his . The patient is on a court hold until that 5-day period has run its course.

## 2024-07-31 NOTE — ED NOTES
Received call from Claritza at [facility]al [facility] who informed writer that patient has a bed assigned at [place], Station [facility]. Accepting provider Dr. De Jesus. Per Claritza, the unit will be calling writer for report. Their phone number is [phone].    Dr. Soler was notified and updated the patient.

## 2024-07-31 NOTE — DISCHARGE INSTRUCTIONS
"    Coordinators from [facility] will be calling you in the next 24-48 hours to ensure that you have the resources you need. For additonal need of mental health services, you can also contact [facility] coordinators directly at [phone].    Recommendations:    1) Take all medications as prescribed by psychiatry.  2) Follow up with psychiatry appointment on August 2nd  3)  Follow up with the [place] [facility] for therapy    A referral to the [facility] [place] has been initiated for brief therapy. TC staff member will contact you within the next 24 hours to set up an appointment. [facility] is like \"urgent care\" for mental health. The appointment will be virtual and they possibly can meet with you within a few days. TC can meet and provide services until you are able to start with your next level of care. Contact [facility]  #[phone] if you have questions.       [facility]  [address], [place] [address]  [phone]      If I am feeling unsafe or I am in a crisis, I will:  - Contact my established care providers   - Call the National Suicide Prevention Lifeline: 239.325.4848   - [place] CRISIS TEXT Line 719150  - Go to the nearest emergency room   - Call 911 or 848 or 211    Below is a list of FREE Mental Health Options in the [facility]:  [facility]  [address] [place] [address], 24/7 Crisis Intervention Center  Serves those in emotional crisis with 24-hour, seven-day-a-week crisis counseling, assessment, referral, and medication management.      Suicidal: [phone] Consultation: [phone]     [facility]  [phone]  Serves those in need of free outpatient mental health care  Hours: Mon, Wed, Fri 1-3pm; Mon-Thurs 6:30-8:30pm    [facility]  [address] [place] [address]  [phone]    Peer Support  Call the [facility] Helpline " "at  [phone]  Or text \"HelpLine\" to 35833             "

## 2024-07-31 NOTE — ED NOTES
"Writer met with patient and attempted to complete behavioral health assessment. However, patient is avoidant during our encounter, does not engage in conversation or make eye contact, and states \"I'm not trying to talk right now.\" Affect is flat. He is calm and compliant with med administration. Continuous observation via camera ongoing.  "

## 2024-07-31 NOTE — TELEPHONE ENCOUNTER
R: [place]  Access Inpatient [place] Call Log  7/31/24 @ 1:00am   Intake has called facilities that have not updated their bed status within the last 12 hours.     *METRO:  [place] -- [facility]: @ capacity.  [facility]: @ cap per website. Reporting no reviews overnight.  [place] -- [facility]: @ cap per website. Low acuity  [place] -- [facility]: @ cap per website. Low acuity only.  [place] -- [facility]: @ cap per website.  [facility]: @ cap per website.   [facility]/ beds: POSTING 2 BEDS. Ages 18-35, Voluntary only, NO aggression/physical/sexual assault, violence hx or drug abuse, or psychosis. Negative Covid  [place] -- [facility]: @ cap per website.  [place] -- [facility]: @ cap per website.  [place] -- [facility]: @ cap per website. Do not review overnight.      Pt remains on waitlist pending appropriate placement availability

## 2024-07-31 NOTE — ED NOTES
Vi RODRIGUEZ RN who is watching patient via camera notified writer that patient came out of room and requested that staff print and provide to him physical copies of all medical records from his ED stay prior to transport. Patient was notified that he will need to make a formal request to Medical Records department for this. He is now pacing the room.

## 2024-07-31 NOTE — PLAN OF CARE
Goal Outcome Evaluation: Pt is requesting hard copies of his medical record before he leaves to [place]. Medical record phone number was given to the pt to call and request for his record.

## 2024-07-31 NOTE — ED PROVIDER NOTES
[facility] EMERGENCY DEPARTMENT   ED Mental Health Observation - Daily Note for 7/31/2024    Edward Resendez is a 24 year old male currently boarding in the ED while awaiting placement for Mental health crisis.  Please see the initial H&P for this patient's presentation, workup, and disposition plan.     Hold Status:  Patient is Voluntary, but holdable and on a 72 hour hold    Plan:  In brief, the patient's presentation is notable for paranoia, psychosis, agitation.   Patient has been seen by psychiatry here who has him on Zyprexa injections twice a day.  He has been refusing to meet with extended care.  It appears crisis team has filed paperwork for revoked commitment      Patient is awaiting Mental health placement    Interim History:  There were no significant events since last note.  Patient has been accepted to [place] [address] by Dr. De Jesus per nursing.     Physical Exam:  /59 (BP Location: Left arm, Patient Position: Sitting, Cuff Size: Adult Regular)   Pulse 83   Temp 97.9  F (36.6  C) (Oral)   Resp 16   Ht 1.829 m (6')   Wt 103.1 kg (227 lb 4.7 oz)   SpO2 98%   BMI 30.83 kg/m    No respiratory distress, on room air   Well perfused  Behavior appropriate    Medications provided prior to my care:  Medications   acetaminophen (TYLENOL) tablet 650 mg (650 mg Oral $Given 7/29/24 2321)   atomoxetine (STRATTERA) capsule 100 mg (100 mg Oral $Given 7/31/24 0926)   benztropine (COGENTIN) tablet 0.5 mg (0.5 mg Oral $Given 7/31/24 0925)   gabapentin (NEURONTIN) capsule 300 mg (300 mg Oral $Given 7/31/24 0924)   lithium ER (LITHOBID) CR tablet 600 mg (600 mg Oral $Given 7/30/24 2157)   levETIRAcetam (KEPPRA) tablet 1,250 mg (1,250 mg Oral $Given 7/31/24 0928)   acyclovir (ZOVIRAX) tablet 400 mg (400 mg Oral $Given 7/31/24 0925)   propranolol (INDERAL) tablet 10 mg (10 mg Oral $Given 7/31/24 0927)   QUEtiapine (SEROquel) tablet 250 mg (250 mg Oral $Given 7/30/24 2400)    OLANZapine (zyPREXA) injection 5 mg ( Intramuscular See Alternative 7/30/24 1508)     Or   OLANZapine (zyPREXA) tablet 5 mg (5 mg Oral $Given 7/30/24 1509)   QUEtiapine (SEROquel) half-tab 25 mg (25 mg Oral $Given 7/31/24 0968)   QUEtiapine (SEROquel) tablet 200 mg (200 mg Oral $Given 7/29/24 1532)       Laboratory (reviewed and interpreted):  Labs Ordered and Resulted from Time of ED Arrival to Time of ED Departure   COMPREHENSIVE METABOLIC PANEL - Abnormal       Result Value    Sodium 141      Potassium 4.2      Carbon Dioxide (CO2) 23      Anion Gap 11      Urea Nitrogen 11.6      Creatinine 1.00      GFR Estimate >90      Calcium 9.4      Chloride 107      Glucose 107 (*)     Alkaline Phosphatase 79      AST 32      ALT 79 (*)     Protein Total 7.3      Albumin 4.7      Bilirubin Total 0.4     ROUTINE UA WITH MICROSCOPIC REFLEX TO CULTURE - Abnormal    Color Urine Light Yellow      Appearance Urine Clear      Glucose Urine Negative      Bilirubin Urine Negative      Ketones Urine Negative      Specific Gravity Urine 1.020      Blood Urine Negative      pH Urine 6.5      Protein Albumin Urine Negative      Urobilinogen Urine <2.0      Nitrite Urine Negative      Leukocyte Esterase Urine Negative      Mucus Urine Present (*)     RBC Urine <1      WBC Urine <1     CBC WITH PLATELETS - Normal    WBC Count 6.5      RBC Count 5.19      Hemoglobin 16.0      Hematocrit 47.1      MCV 91      MCH 30.8      MCHC 34.0      RDW 12.6      Platelet Count 219     URINE DRUG SCREEN PANEL - Normal    Amphetamines Urine Screen Negative      Barbituates Urine Screen Negative      Benzodiazepine Urine Screen Negative      Cannabinoids Urine Screen Negative      Cocaine Urine Screen Negative      Fentanyl Qual Urine Screen Negative      Opiates Urine Screen Negative      PCP Urine Screen Negative     LITHIUM LEVEL - Normal    Lithium 0.94         ED Course:  1130 AM I met with the patient and discussed plan with care team.      Patient is irritable does not want the lights on.  Does not want to talk.  Has no medical concerns.  Did watch him ambulate to the bathroom earlier in the shift without any limp.  Per nursing he is taking his psychiatric medications but has not engaging in conversation or therapeutic assistance    Crisis evaluator may meet with the patient later today per nursing but so far he has not been willing to meet with him    Impression/Plan:  1. Paranoia (H)    2. Homicidal ideation    3. Aggressive behavior    4. Acute psychosis (H)        MD Karlene Villafana Steven J, MD  07/31/24 1143       Darwin Soler MD  07/31/24 6303

## 2024-08-01 ENCOUNTER — MEDICAL CORRESPONDENCE (OUTPATIENT)
Dept: HEALTH INFORMATION MANAGEMENT | Facility: CLINIC | Age: 24
End: 2024-08-01
Payer: COMMERCIAL

## 2024-08-01 PROCEDURE — 99222 1ST HOSP IP/OBS MODERATE 55: CPT | Performed by: CLINICAL NURSE SPECIALIST

## 2024-08-01 PROCEDURE — 124N000002 HC R&B MH UMMC

## 2024-08-01 PROCEDURE — 250N000013 HC RX MED GY IP 250 OP 250 PS 637: Performed by: CLINICAL NURSE SPECIALIST

## 2024-08-01 PROCEDURE — 97150 GROUP THERAPEUTIC PROCEDURES: CPT | Mod: GO

## 2024-08-01 RX ADMIN — LEVETIRACETAM 1000 MG: 500 TABLET, FILM COATED ORAL at 19:10

## 2024-08-01 RX ADMIN — LEVETIRACETAM 1000 MG: 500 TABLET, FILM COATED ORAL at 08:35

## 2024-08-01 RX ADMIN — PROPRANOLOL HYDROCHLORIDE 10 MG: 10 TABLET ORAL at 08:32

## 2024-08-01 RX ADMIN — LITHIUM CARBONATE 600 MG: 300 TABLET, EXTENDED RELEASE ORAL at 21:20

## 2024-08-01 RX ADMIN — GABAPENTIN 300 MG: 300 CAPSULE ORAL at 13:02

## 2024-08-01 RX ADMIN — QUETIAPINE FUMARATE 200 MG: 200 TABLET ORAL at 21:20

## 2024-08-01 RX ADMIN — LEVETIRACETAM 250 MG: 250 TABLET, FILM COATED ORAL at 19:10

## 2024-08-01 RX ADMIN — ACYCLOVIR 400 MG: 400 TABLET ORAL at 08:32

## 2024-08-01 RX ADMIN — ATOMOXETINE 100 MG: 100 CAPSULE ORAL at 09:03

## 2024-08-01 RX ADMIN — GABAPENTIN 300 MG: 300 CAPSULE ORAL at 08:32

## 2024-08-01 RX ADMIN — NICOTINE POLACRILEX 4 MG: 4 GUM, CHEWING BUCCAL at 09:37

## 2024-08-01 RX ADMIN — LEVETIRACETAM 250 MG: 250 TABLET, FILM COATED ORAL at 08:35

## 2024-08-01 RX ADMIN — NICOTINE POLACRILEX 4 MG: 4 GUM, CHEWING BUCCAL at 19:38

## 2024-08-01 RX ADMIN — PROPRANOLOL HYDROCHLORIDE 10 MG: 10 TABLET ORAL at 19:09

## 2024-08-01 RX ADMIN — Medication 25 MCG: at 08:32

## 2024-08-01 RX ADMIN — ACYCLOVIR 400 MG: 400 TABLET ORAL at 19:11

## 2024-08-01 RX ADMIN — GABAPENTIN 300 MG: 300 CAPSULE ORAL at 19:11

## 2024-08-01 RX ADMIN — BENZTROPINE MESYLATE 0.5 MG: 0.5 TABLET ORAL at 08:32

## 2024-08-01 ASSESSMENT — ACTIVITIES OF DAILY LIVING (ADL)
ADLS_ACUITY_SCORE: 28

## 2024-08-01 NOTE — PLAN OF CARE
07/31/24 1916   Patient Belongings   Patient Belongings remains on inpatient care area;locker   Patient Belongings Put in Hospital Secure Location (Security or Locker, etc.) clothing;plastic bag;shoes   Belongings Search Yes   Clothing Search Yes     Locker:  (1) black plastic trashbag full of clothing  (1) white plastic trashbag full of clothing  (1) white plastic patient belongings bag with clothing  (1) black plastic trashbag with notebooks/books/binders/school supplies  (1) white plastic patient belonging bag with vaporizers  1 pair of shoes    8/6/24 - Removed a $50 bill from Edward's locker so he could mail it to his father. Galvez is sealed in envelope ready to go out in the post tomorrow morning. - Юлия BURCH    Security:  Valuables Envelope #[id number]  -- (40) sealed envelopes with cash  -- (1) check  Valuables Envelope #[id number]  -- (40) sealed envelopes with cash        A               Admission:  I am responsible for any personal items that are not sent to the safe or pharmacy.  [facility] is not responsible for loss, theft or damage of any property in my possession.    Signature:  _________________________________ Date: _______  Time: _____                                              Staff Signature:  ____________________________ Date: ________  Time: _____      2nd Staff person, if patient is unable/unwilling to sign:    Signature: ________________________________ Date: ________  Time: _____     Discharge:  [facility] has returned all of my personal belongings:    Signature: _________________________________ Date: ________  Time: _____                                          Staff Signature:  ____________________________ Date: ________  Time: _____

## 2024-08-01 NOTE — PLAN OF CARE
"  Problem: Adult Behavioral Health Plan of Care  Goal: Adheres to Safety Considerations for Self and Others  Outcome: Progressing     Problem: Adult Behavioral Health Plan of Care  Goal: Absence of New-Onset Illness or Injury  Outcome: Progressing   Pt has been considerate of self and others, no signs of aggression noted and he has been free of illness and injury. Pt presents with a depressed mood and flat affect, he denies SI/SIB/HI/AVH. He reports pain stating, \"my back is broken,\" however, he refused to rate his pain, describe it, or utilize any interventions offered to help with pain. When asked how nursing could help he stated, \"it doesn't matter.\" He was encouraged to come to nursing with any questions or concerns or if he changed is mind regarding pain relieving interventions. He took his medication as prescribed and has been appropriate and in behavioral control in the milieu.                         "
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
## 2024-08-01 NOTE — PLAN OF CARE
BEH IP Unit Acuity Rating Score (UARS)  Patient is given one point for every criteria they meet.    CRITERIA SCORING   On a 72 hour hold, court hold, committed, stay of commitment, or revocation. 1    Patient LOS on BEH unit exceeds 20 days. 0  LOS: 1   Patient under guardianship, 55+, otherwise medically complex, or under age 11. 0   Suicide ideation without relief of precipitating factors. 0   Current plan for suicide. 0   Current plan for homicide. 0   Imminent risk or actual attempt to seriously harm another without relief of factors precipitating the attempt. 1   Severe dysfunction in daily living (ex: complete neglect for self care, extreme disruption in vegetative function, extreme deterioration in social interactions). 1   Recent (last 7 days) or current physical aggression in the ED or on unit. 0   Restraints or seclusion episode in past 72 hours. 0   Recent (last 7 days) or current verbal aggression, agitation, yelling, etc., while in the ED or unit. 0   Active psychosis. 1   Need for constant or near constant redirection (from leaving, from others, etc).  0   Intrusive or disruptive behaviors. 0   Patient requires 3 or more hours of individualized nursing care per 8-hour shift (i.e. for ADLs, meds, therapeutic interventions). 0   TOTAL 4
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
## 2024-08-01 NOTE — PLAN OF CARE
Team Note Due:  Thursday    Assessment/Intervention/Current Symtoms and Care Coordination:  Chart review and met with team, discussed pt progress, symptomology, and response to treatment. Discussed the discharge plan and any potential impediments to discharge.    I met with pt to introduce self. He asked about getting his GA transferred here?   I sent his CM an email and voicemail asking for a copy of the Rose order.   We received the ex parte order and continued commitment. I gave this to patient. Continued Commitment + Rose expires 1/6/25. I notarized the return of service and faxed it back to [facility]. A copy placed in binder. Pt was sitting on the counter in his room, no tv on when I knocked on his door. He stated he had no questions at this time. He said he wants to discharge to [facility] in [facility] after hospitalization.     Behavioral team note complete.    Completed his initial psychosocial assessment.     Sent update to commitment CM Lou Herrera and asked if he can return to [facility] after discharging from here.     Discharge Plan or Goal:  TBD     Barriers to Discharge:  Symptoms    Referral Status:  TBD     Legal Status:  Continued Commitment + Rose   County: [place]   File Number: [id number]  Start and expiration date of commitment: 01/06/25    Rose Meds are:   Contacts:  : Lou Herrera Ph: [phone]  Email: [email or web address]  Lou Herrera cell phone: [phone]      Maggie from [facility]. [phone]     Upcoming Meetings and Dates/Important Information and next steps:  Coordinate care.

## 2024-08-01 NOTE — PHARMACY-ADMISSION MEDICATION HISTORY
"Admission Medication History Completed by Pharmacy    Please refer to pharmacy progress note on 7/27, \"Pharmacy-Admission Medication History\" under previous encounter at [facility] for information regarding prior to admission medications.    Yadi Greco, Pharm.D., [facility]  Behavioral Health Inpatient Pharmacist  [facility] Emergency Department  Contact via [email or web address] or Epic Messaging       "
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
## 2024-08-01 NOTE — PLAN OF CARE
Rehab Group    Start time: 1045  End time: 1145  Patient time total: 15 minutes    attended partial group     #3 attended   Group Type: OT Clinic   Group Topic Covered: coping skills     Group Session Detail:    Intervention:  Pt participated in a OT Clinic group to facilitate coping skills exploration and creative expression through personally meaningful activities, and to encourage utilization of these healthy coping skills to promote overall health and wellness. Group included clinical observation of social, cognitive and kinesthetic performance skills to inform treatment and safe discharge planning.    Mood/Affect: Pleasant     Plan: Patient encouraged to maintain attendance for continued ongoing support in working towards occupational therapy goals to support overall treatment/care.        Patient Detail:    Patient joined for last part of group. Writer introduced clinic group and project options available to them. Patient requested sketch pad, pencil and paper to work on during this time. Appeared to writer on a couple pages briefly and elected to leave group shortly afterwards. Was encouraged to attend future groups. Stated he was not interested in art therapy group later today.       [id number] OT Group (2 or more in attendance)      Patient Active Problem List   Diagnosis    Psychosis, unspecified psychosis type (H)    Intellectual disability    Acute psychosis (H)
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
## 2024-08-02 PROCEDURE — 99232 SBSQ HOSP IP/OBS MODERATE 35: CPT | Performed by: CLINICAL NURSE SPECIALIST

## 2024-08-02 PROCEDURE — 250N000013 HC RX MED GY IP 250 OP 250 PS 637: Performed by: CLINICAL NURSE SPECIALIST

## 2024-08-02 PROCEDURE — 124N000002 HC R&B MH UMMC

## 2024-08-02 RX ORDER — PALIPERIDONE 3 MG/1
3 TABLET, EXTENDED RELEASE ORAL DAILY
Status: DISCONTINUED | OUTPATIENT
Start: 2024-08-02 | End: 2024-08-09

## 2024-08-02 RX ORDER — HALOPERIDOL 5 MG/ML
5 INJECTION INTRAMUSCULAR DAILY
Status: DISCONTINUED | OUTPATIENT
Start: 2024-08-03 | End: 2024-08-02 | Stop reason: SINTOL

## 2024-08-02 RX ORDER — RISPERIDONE 0.5 MG/1
0.5 TABLET ORAL DAILY
Status: DISCONTINUED | OUTPATIENT
Start: 2024-08-03 | End: 2024-08-02 | Stop reason: SINTOL

## 2024-08-02 RX ORDER — HALOPERIDOL 5 MG/ML
5 INJECTION INTRAMUSCULAR DAILY
Status: DISCONTINUED | OUTPATIENT
Start: 2024-08-02 | End: 2024-08-09

## 2024-08-02 RX ORDER — ACETAMINOPHEN 325 MG/1
650 TABLET ORAL EVERY 8 HOURS PRN
Status: DISCONTINUED | OUTPATIENT
Start: 2024-08-02 | End: 2024-08-14 | Stop reason: HOSPADM

## 2024-08-02 RX ORDER — RISPERIDONE 0.5 MG/1
0.5 TABLET ORAL DAILY
Status: DISCONTINUED | OUTPATIENT
Start: 2024-08-02 | End: 2024-08-02

## 2024-08-02 RX ORDER — ACYCLOVIR 400 MG/1
800 TABLET ORAL 2 TIMES DAILY
Status: DISCONTINUED | OUTPATIENT
Start: 2024-08-03 | End: 2024-08-08

## 2024-08-02 RX ADMIN — PALIPERIDONE 3 MG: 3 TABLET, EXTENDED RELEASE ORAL at 11:17

## 2024-08-02 RX ADMIN — BENZTROPINE MESYLATE 0.5 MG: 0.5 TABLET ORAL at 07:56

## 2024-08-02 RX ADMIN — LITHIUM CARBONATE 600 MG: 300 TABLET, EXTENDED RELEASE ORAL at 19:19

## 2024-08-02 RX ADMIN — ACYCLOVIR 400 MG: 400 TABLET ORAL at 07:56

## 2024-08-02 RX ADMIN — ACETAMINOPHEN 650 MG: 325 TABLET, FILM COATED ORAL at 18:11

## 2024-08-02 RX ADMIN — Medication 25 MCG: at 07:56

## 2024-08-02 RX ADMIN — GABAPENTIN 300 MG: 300 CAPSULE ORAL at 14:21

## 2024-08-02 RX ADMIN — QUETIAPINE FUMARATE 200 MG: 200 TABLET ORAL at 19:19

## 2024-08-02 RX ADMIN — LEVETIRACETAM 250 MG: 250 TABLET, FILM COATED ORAL at 07:59

## 2024-08-02 RX ADMIN — NICOTINE POLACRILEX 4 MG: 4 GUM, CHEWING BUCCAL at 14:35

## 2024-08-02 RX ADMIN — ACYCLOVIR 400 MG: 400 TABLET ORAL at 19:19

## 2024-08-02 RX ADMIN — LEVETIRACETAM 1000 MG: 500 TABLET, FILM COATED ORAL at 07:56

## 2024-08-02 RX ADMIN — PROPRANOLOL HYDROCHLORIDE 10 MG: 10 TABLET ORAL at 19:19

## 2024-08-02 RX ADMIN — LEVETIRACETAM 250 MG: 250 TABLET, FILM COATED ORAL at 19:20

## 2024-08-02 RX ADMIN — NICOTINE POLACRILEX 4 MG: 4 GUM, CHEWING BUCCAL at 11:17

## 2024-08-02 RX ADMIN — GABAPENTIN 300 MG: 300 CAPSULE ORAL at 19:19

## 2024-08-02 RX ADMIN — PROPRANOLOL HYDROCHLORIDE 10 MG: 10 TABLET ORAL at 07:56

## 2024-08-02 RX ADMIN — GABAPENTIN 300 MG: 300 CAPSULE ORAL at 07:56

## 2024-08-02 RX ADMIN — NICOTINE POLACRILEX 4 MG: 4 GUM, CHEWING BUCCAL at 18:11

## 2024-08-02 RX ADMIN — LEVETIRACETAM 1000 MG: 500 TABLET, FILM COATED ORAL at 19:18

## 2024-08-02 RX ADMIN — ATOMOXETINE 100 MG: 100 CAPSULE ORAL at 07:56

## 2024-08-02 ASSESSMENT — ACTIVITIES OF DAILY LIVING (ADL)
ADLS_ACUITY_SCORE: 28
DRESS: INDEPENDENT;SCRUBS (BEHAVIORAL HEALTH)
ADLS_ACUITY_SCORE: 28
ORAL_HYGIENE: INDEPENDENT
ADLS_ACUITY_SCORE: 28
HYGIENE/GROOMING: INDEPENDENT
ADLS_ACUITY_SCORE: 28

## 2024-08-02 NOTE — PLAN OF CARE
"AM    Assumed care at 1130    Denies all psychiatric symptoms. Appears withdrawn, paranoid. Pacing hallway. No acute behavioral concern noted.     Denies pain. When asked pt if he has any physical concern, responded with \"yeah, but your doctors aren't smart enough,\" did not elaborate.     PM    Denies all psychiatric symptoms. Cooperative, calm. Intermittently meandering in milieu. Not observed socializing. No acute behavioral issues noted.     Endorses hip pain related to a fall he sustained approximately one year ago. Given PRN Tylenol. No adverse effects of medication noted or reported.     /84 (BP Location: Left arm)   Pulse 89   Temp 97.5  F (36.4  C) (Temporal)   Resp 18   Ht 1.829 m (6')   Wt 101.9 kg (224 lb 9.6 oz)   SpO2 95%   BMI 30.46 kg/m       "

## 2024-08-02 NOTE — PROGRESS NOTES
Yogesh pacing quietly in the hallway.  Communicating so far this morning by nodding only.  Accepted and took all of his scheduled meds.  No prns requested. Denies any pain or concerns. Cooperative but appears slightly paranoid and anxious..  No aggression noted.    Nicotine gum given as well as newly prescribed Invega.
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
## 2024-08-02 NOTE — PLAN OF CARE
BEH IP Unit Acuity Rating Score (UARS)  Patient is given one point for every criteria they meet.    CRITERIA SCORING   On a 72 hour hold, court hold, committed, stay of commitment, or revocation. 1    Patient LOS on BEH unit exceeds 20 days. 0  LOS: 2   Patient under guardianship, 55+, otherwise medically complex, or under age 11. 0   Suicide ideation without relief of precipitating factors. 0   Current plan for suicide. 0   Current plan for homicide. 0   Imminent risk or actual attempt to seriously harm another without relief of factors precipitating the attempt. 1   Severe dysfunction in daily living (ex: complete neglect for self care, extreme disruption in vegetative function, extreme deterioration in social interactions). 1   Recent (last 7 days) or current physical aggression in the ED or on unit. 0   Restraints or seclusion episode in past 72 hours. 0   Recent (last 7 days) or current verbal aggression, agitation, yelling, etc., while in the ED or unit. 0   Active psychosis. 1   Need for constant or near constant redirection (from leaving, from others, etc).  0   Intrusive or disruptive behaviors. 0   Patient requires 3 or more hours of individualized nursing care per 8-hour shift (i.e. for ADLs, meds, therapeutic interventions). 0   TOTAL 4
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
## 2024-08-03 PROCEDURE — 124N000002 HC R&B MH UMMC

## 2024-08-03 PROCEDURE — 250N000013 HC RX MED GY IP 250 OP 250 PS 637: Performed by: CLINICAL NURSE SPECIALIST

## 2024-08-03 RX ADMIN — NICOTINE POLACRILEX 4 MG: 4 GUM, CHEWING BUCCAL at 11:29

## 2024-08-03 RX ADMIN — Medication 25 MCG: at 07:39

## 2024-08-03 RX ADMIN — GABAPENTIN 300 MG: 300 CAPSULE ORAL at 13:12

## 2024-08-03 RX ADMIN — LEVETIRACETAM 1000 MG: 500 TABLET, FILM COATED ORAL at 19:20

## 2024-08-03 RX ADMIN — ACYCLOVIR 800 MG: 400 TABLET ORAL at 07:38

## 2024-08-03 RX ADMIN — BENZTROPINE MESYLATE 0.5 MG: 0.5 TABLET ORAL at 07:39

## 2024-08-03 RX ADMIN — NICOTINE POLACRILEX 4 MG: 4 GUM, CHEWING BUCCAL at 13:12

## 2024-08-03 RX ADMIN — NICOTINE POLACRILEX 4 MG: 4 GUM, CHEWING BUCCAL at 21:00

## 2024-08-03 RX ADMIN — NICOTINE POLACRILEX 4 MG: 4 GUM, CHEWING BUCCAL at 15:54

## 2024-08-03 RX ADMIN — QUETIAPINE FUMARATE 200 MG: 200 TABLET ORAL at 19:20

## 2024-08-03 RX ADMIN — LEVETIRACETAM 250 MG: 250 TABLET, FILM COATED ORAL at 19:19

## 2024-08-03 RX ADMIN — GABAPENTIN 300 MG: 300 CAPSULE ORAL at 07:38

## 2024-08-03 RX ADMIN — NICOTINE POLACRILEX 4 MG: 4 GUM, CHEWING BUCCAL at 07:39

## 2024-08-03 RX ADMIN — LEVETIRACETAM 1000 MG: 500 TABLET, FILM COATED ORAL at 07:38

## 2024-08-03 RX ADMIN — GABAPENTIN 300 MG: 300 CAPSULE ORAL at 19:19

## 2024-08-03 RX ADMIN — ATOMOXETINE 100 MG: 100 CAPSULE ORAL at 07:38

## 2024-08-03 RX ADMIN — PROPRANOLOL HYDROCHLORIDE 10 MG: 10 TABLET ORAL at 19:20

## 2024-08-03 RX ADMIN — LEVETIRACETAM 250 MG: 250 TABLET, FILM COATED ORAL at 07:39

## 2024-08-03 RX ADMIN — PALIPERIDONE 3 MG: 3 TABLET, EXTENDED RELEASE ORAL at 07:38

## 2024-08-03 RX ADMIN — PROPRANOLOL HYDROCHLORIDE 10 MG: 10 TABLET ORAL at 07:39

## 2024-08-03 RX ADMIN — ACETAMINOPHEN 650 MG: 325 TABLET, FILM COATED ORAL at 19:24

## 2024-08-03 RX ADMIN — ACYCLOVIR 800 MG: 400 TABLET ORAL at 19:20

## 2024-08-03 RX ADMIN — LITHIUM CARBONATE 600 MG: 300 TABLET, EXTENDED RELEASE ORAL at 19:19

## 2024-08-03 ASSESSMENT — ACTIVITIES OF DAILY LIVING (ADL)
ADLS_ACUITY_SCORE: 28
ORAL_HYGIENE: INDEPENDENT
ADLS_ACUITY_SCORE: 28
HYGIENE/GROOMING: INDEPENDENT
ADLS_ACUITY_SCORE: 28
ADLS_ACUITY_SCORE: 28
DRESS: SCRUBS (BEHAVIORAL HEALTH);INDEPENDENT
ADLS_ACUITY_SCORE: 28

## 2024-08-03 NOTE — PLAN OF CARE
Problem: Sleep Disturbance  Goal: Adequate Sleep/Rest  Outcome: Progressing   Goal Outcome Evaluation:    Patient appeared to sleep 6.25 hours this night shift.  No prns or snacks given or requested.  No concerns were reported or noted.

## 2024-08-03 NOTE — PLAN OF CARE
Presents as withdrawn, isolating to room, not observed socializing. Upon assessment, denies depression, anxiety, having any thoughts of harming self or others. Denied experiencing any hallucinations. No acute behavioral concerns noted this shift.     Came to medication window to request scheduled medication. No adverse effects of medication noted or reported.     Reported hand and back pain rated 6/10, given PRN Tylenol.     /86 (BP Location: Right arm)   Pulse 91   Temp 97.6  F (36.4  C) (Temporal)   Resp 16   Ht 1.829 m (6')   Wt 101.9 kg (224 lb 9.6 oz)   SpO2 96%   BMI 30.46 kg/m

## 2024-08-03 NOTE — PLAN OF CARE
Patient presents as quiet, guarded, and socially withdrawn.  He appears paranoid on approach, and he was noted to be hypervigilant when visible in the milieu.  Edward is dismissive of his current psychotic symptoms, and he currently denies all symptoms of mental illness.  He was behaviorally controlled over the balance of this day shift with no aggressive behaviors noted.  Edward denies any self harm or aggressive ideations at this time.  He accepted all of his scheduled medications without incident, and no adverse side effects have been reported or observed.  He denies any acute physical concerns at this time.  /81 (BP Location: Left arm)   Pulse 83   Temp 97.5  F (36.4  C) (Temporal)   Resp 16   Ht 1.829 m (6')   Wt 101.9 kg (224 lb 9.6 oz)   SpO2 95%   BMI 30.46 kg/m

## 2024-08-04 PROCEDURE — 250N000013 HC RX MED GY IP 250 OP 250 PS 637: Performed by: CLINICAL NURSE SPECIALIST

## 2024-08-04 PROCEDURE — 124N000002 HC R&B MH UMMC

## 2024-08-04 RX ADMIN — NICOTINE POLACRILEX 4 MG: 4 GUM, CHEWING BUCCAL at 18:53

## 2024-08-04 RX ADMIN — ACYCLOVIR 800 MG: 400 TABLET ORAL at 19:06

## 2024-08-04 RX ADMIN — NICOTINE POLACRILEX 4 MG: 4 GUM, CHEWING BUCCAL at 08:42

## 2024-08-04 RX ADMIN — LEVETIRACETAM 250 MG: 250 TABLET, FILM COATED ORAL at 08:41

## 2024-08-04 RX ADMIN — NICOTINE POLACRILEX 4 MG: 4 GUM, CHEWING BUCCAL at 13:23

## 2024-08-04 RX ADMIN — PROPRANOLOL HYDROCHLORIDE 10 MG: 10 TABLET ORAL at 08:40

## 2024-08-04 RX ADMIN — PROPRANOLOL HYDROCHLORIDE 10 MG: 10 TABLET ORAL at 19:05

## 2024-08-04 RX ADMIN — LEVETIRACETAM 1000 MG: 500 TABLET, FILM COATED ORAL at 08:41

## 2024-08-04 RX ADMIN — QUETIAPINE FUMARATE 200 MG: 200 TABLET ORAL at 19:05

## 2024-08-04 RX ADMIN — GABAPENTIN 300 MG: 300 CAPSULE ORAL at 19:06

## 2024-08-04 RX ADMIN — ACETAMINOPHEN 650 MG: 325 TABLET, FILM COATED ORAL at 19:38

## 2024-08-04 RX ADMIN — Medication 25 MCG: at 08:42

## 2024-08-04 RX ADMIN — PALIPERIDONE 3 MG: 3 TABLET, EXTENDED RELEASE ORAL at 08:40

## 2024-08-04 RX ADMIN — LEVETIRACETAM 250 MG: 250 TABLET, FILM COATED ORAL at 19:06

## 2024-08-04 RX ADMIN — NICOTINE POLACRILEX 4 MG: 4 GUM, CHEWING BUCCAL at 15:58

## 2024-08-04 RX ADMIN — BENZTROPINE MESYLATE 0.5 MG: 0.5 TABLET ORAL at 08:41

## 2024-08-04 RX ADMIN — ATOMOXETINE 100 MG: 100 CAPSULE ORAL at 08:41

## 2024-08-04 RX ADMIN — LEVETIRACETAM 1000 MG: 500 TABLET, FILM COATED ORAL at 19:06

## 2024-08-04 RX ADMIN — LITHIUM CARBONATE 600 MG: 300 TABLET, EXTENDED RELEASE ORAL at 19:05

## 2024-08-04 RX ADMIN — ACYCLOVIR 800 MG: 400 TABLET ORAL at 08:42

## 2024-08-04 RX ADMIN — GABAPENTIN 300 MG: 300 CAPSULE ORAL at 08:41

## 2024-08-04 RX ADMIN — GABAPENTIN 300 MG: 300 CAPSULE ORAL at 13:23

## 2024-08-04 ASSESSMENT — ACTIVITIES OF DAILY LIVING (ADL)
ADLS_ACUITY_SCORE: 28
DRESS: SCRUBS (BEHAVIORAL HEALTH);INDEPENDENT
ADLS_ACUITY_SCORE: 28
ORAL_HYGIENE: INDEPENDENT
ADLS_ACUITY_SCORE: 28
ADLS_ACUITY_SCORE: 28
HYGIENE/GROOMING: INDEPENDENT
ADLS_ACUITY_SCORE: 28
DRESS: INDEPENDENT;SCRUBS (BEHAVIORAL HEALTH)
ADLS_ACUITY_SCORE: 28
HYGIENE/GROOMING: INDEPENDENT
ADLS_ACUITY_SCORE: 28
ORAL_HYGIENE: INDEPENDENT

## 2024-08-04 NOTE — PLAN OF CARE
"Patient presents as quiet, guarded, and socially withdrawn with a flat affect.  He is irritable and dismissive on approach.  Edward denies that he is experiencing any manic/ psychotic symptoms or dangerous ideations at this time.  He mostly isolates in his room.  Edward accepted all of his scheduled medications without incident this shift, and no adverse side effects have been reported by the patient or observed by RN writer.  He has refused RN writer's offers to review his medications with him, stating \"I don't need any explanations from you- I know my medications!\".  He has been getting adequate sleep, nutrition, and fluids.  Edward denies any acute physical concerns at this time.  /80   Pulse 80   Temp 97.5  F (36.4  C) (Temporal)   Resp 16   Ht 1.829 m (6')   Wt 101.9 kg (224 lb 9.6 oz)   SpO2 99%   BMI 30.46 kg/m            "

## 2024-08-04 NOTE — PLAN OF CARE
Denies all psychological symptoms. Appears withdrawn, somewhat guarded. Flat affect. Not observed socializing. Mostly isolating to room, occasionally meandering in hallway.  Came to medication window to request scheduled medications.   A staff member came to writer stating that Edward had told her that he is not comfortable opening up to males, and stated to this staff that he is experiencing auditory hallucinations.   Endorsed having a headache, requested and given Tylenol.     /84 (BP Location: Left arm)   Pulse 100   Temp 97.8  F (36.6  C) (Temporal)   Resp 16   Ht 1.829 m (6')   Wt 101.9 kg (224 lb 9.6 oz)   SpO2 96%   BMI 30.46 kg/m

## 2024-08-04 NOTE — PSYCH
"Patient confided with me that he has been experiencing visual and audial hallucinations. He also stated he doesn't like \"expressing things to males\" (i.e. confiding), only to females.  "

## 2024-08-05 PROCEDURE — 250N000013 HC RX MED GY IP 250 OP 250 PS 637: Performed by: CLINICAL NURSE SPECIALIST

## 2024-08-05 PROCEDURE — 124N000002 HC R&B MH UMMC

## 2024-08-05 PROCEDURE — 99232 SBSQ HOSP IP/OBS MODERATE 35: CPT | Performed by: CLINICAL NURSE SPECIALIST

## 2024-08-05 RX ADMIN — NICOTINE POLACRILEX 4 MG: 4 GUM, CHEWING BUCCAL at 17:15

## 2024-08-05 RX ADMIN — ACYCLOVIR 800 MG: 400 TABLET ORAL at 19:02

## 2024-08-05 RX ADMIN — QUETIAPINE FUMARATE 200 MG: 200 TABLET ORAL at 19:02

## 2024-08-05 RX ADMIN — GABAPENTIN 300 MG: 300 CAPSULE ORAL at 12:36

## 2024-08-05 RX ADMIN — LEVETIRACETAM 1000 MG: 500 TABLET, FILM COATED ORAL at 08:04

## 2024-08-05 RX ADMIN — LITHIUM CARBONATE 600 MG: 300 TABLET, EXTENDED RELEASE ORAL at 19:02

## 2024-08-05 RX ADMIN — ACYCLOVIR 800 MG: 400 TABLET ORAL at 08:03

## 2024-08-05 RX ADMIN — NICOTINE POLACRILEX 4 MG: 4 GUM, CHEWING BUCCAL at 14:37

## 2024-08-05 RX ADMIN — TRAZODONE HYDROCHLORIDE 50 MG: 50 TABLET ORAL at 22:12

## 2024-08-05 RX ADMIN — BENZTROPINE MESYLATE 0.5 MG: 0.5 TABLET ORAL at 08:03

## 2024-08-05 RX ADMIN — NICOTINE POLACRILEX 4 MG: 4 GUM, CHEWING BUCCAL at 15:40

## 2024-08-05 RX ADMIN — GABAPENTIN 300 MG: 300 CAPSULE ORAL at 08:03

## 2024-08-05 RX ADMIN — Medication 25 MCG: at 08:03

## 2024-08-05 RX ADMIN — NICOTINE POLACRILEX 4 MG: 4 GUM, CHEWING BUCCAL at 10:29

## 2024-08-05 RX ADMIN — PALIPERIDONE 3 MG: 3 TABLET, EXTENDED RELEASE ORAL at 08:04

## 2024-08-05 RX ADMIN — NICOTINE POLACRILEX 4 MG: 4 GUM, CHEWING BUCCAL at 22:12

## 2024-08-05 RX ADMIN — PROPRANOLOL HYDROCHLORIDE 10 MG: 10 TABLET ORAL at 08:04

## 2024-08-05 RX ADMIN — NICOTINE POLACRILEX 4 MG: 4 GUM, CHEWING BUCCAL at 12:37

## 2024-08-05 RX ADMIN — GABAPENTIN 300 MG: 300 CAPSULE ORAL at 19:02

## 2024-08-05 RX ADMIN — LEVETIRACETAM 1000 MG: 500 TABLET, FILM COATED ORAL at 19:02

## 2024-08-05 RX ADMIN — PROPRANOLOL HYDROCHLORIDE 10 MG: 10 TABLET ORAL at 19:02

## 2024-08-05 RX ADMIN — LEVETIRACETAM 250 MG: 250 TABLET, FILM COATED ORAL at 08:03

## 2024-08-05 RX ADMIN — LEVETIRACETAM 250 MG: 250 TABLET, FILM COATED ORAL at 19:03

## 2024-08-05 RX ADMIN — NICOTINE POLACRILEX 4 MG: 4 GUM, CHEWING BUCCAL at 08:11

## 2024-08-05 RX ADMIN — ATOMOXETINE 100 MG: 100 CAPSULE ORAL at 08:04

## 2024-08-05 ASSESSMENT — ACTIVITIES OF DAILY LIVING (ADL)
ADLS_ACUITY_SCORE: 28
ORAL_HYGIENE: INDEPENDENT
ADLS_ACUITY_SCORE: 28
DRESS: INDEPENDENT;SCRUBS (BEHAVIORAL HEALTH)
ADLS_ACUITY_SCORE: 28
ORAL_HYGIENE: INDEPENDENT
ADLS_ACUITY_SCORE: 28
HYGIENE/GROOMING: INDEPENDENT;SHOWER
ADLS_ACUITY_SCORE: 28
ADLS_ACUITY_SCORE: 28
HYGIENE/GROOMING: INDEPENDENT
ADLS_ACUITY_SCORE: 28
DRESS: SCRUBS (BEHAVIORAL HEALTH);INDEPENDENT

## 2024-08-05 NOTE — PLAN OF CARE
BEH IP Unit Acuity Rating Score (UARS)  Patient is given one point for every criteria they meet.    CRITERIA SCORING   On a 72 hour hold, court hold, committed, stay of commitment, or revocation. 1    Patient LOS on BEH unit exceeds 20 days. 0  LOS: 5   Patient under guardianship, 55+, otherwise medically complex, or under age 11. 0   Suicide ideation without relief of precipitating factors. 0   Current plan for suicide. 0   Current plan for homicide. 0   Imminent risk or actual attempt to seriously harm another without relief of factors precipitating the attempt. 0   Severe dysfunction in daily living (ex: complete neglect for self care, extreme disruption in vegetative function, extreme deterioration in social interactions). 1   Recent (last 7 days) or current physical aggression in the ED or on unit. 0   Restraints or seclusion episode in past 72 hours. 0   Recent (last 7 days) or current verbal aggression, agitation, yelling, etc., while in the ED or unit. 0   Active psychosis. 1   Need for constant or near constant redirection (from leaving, from others, etc).  0   Intrusive or disruptive behaviors. 0   Patient requires 3 or more hours of individualized nursing care per 8-hour shift (i.e. for ADLs, meds, therapeutic interventions). 0   TOTAL 3
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
## 2024-08-05 NOTE — PLAN OF CARE
Problem: Adult Inpatient Plan of Care  Goal: Optimal Comfort and Wellbeing  Intervention: Provide Person-Centered Care  Recent Flowsheet Documentation  Taken 8/5/2024 0821 by Cris Tran RN  Trust Relationship/Rapport:   care explained   choices provided   emotional support provided   empathic listening provided   questions answered   questions encouraged   reassurance provided   thoughts/feelings acknowledged   Goal Outcome Evaluation:    Plan of Care Reviewed With: patient        Pt had an uneventful  shift. He remained mainly isolative and withdrawn to self. Pt spent some time in the fuentes pacing. He denied all mental health symptoms including SI/HI/AVH, and did not appear to be responding. Pt showered in the morning, and was med complaint without issue.   Pt ate and drank well, denied pain, and had no other acute physical or behavioral concerns this shift.  /89 (BP Location: Right leg, Patient Position: Sitting, Cuff Size: Adult Regular)   Pulse 78   Temp 97.9  F (36.6  C) (Temporal)   Resp 16   Ht 1.829 m (6')   Wt 101.9 kg (224 lb 9.6 oz)   SpO2 99%   BMI 30.46 kg/m

## 2024-08-05 NOTE — PROGRESS NOTES
Rehab Group    Start time: 1115  End time: 1200  Patient time total: 30 minutes    attended partial group    #1 attended   Group Type: music   Group Topic Covered: cognitive activities, coping skills, and relaxation      Group Session Detail: therapeutic music listening session      Patient Response/Contribution:  cooperative with task   Patient Detail:     Edward accepted offer for Music Therapy session this morning.  He was the solo participant, and requested songs about anxiety, etc.  He appeared to have trouble remembering how long he had been here and other information about songs he liked, but was able to recall enough to request several.  He also asked MT to select several songs.   He was polite, but appeared to lack inhibition that would have been natural upon initial meeting of a new person. He thanked MT for session at the end.        No Charge    Patient Active Problem List   Diagnosis    Psychosis, unspecified psychosis type (H)    Intellectual disability    Acute psychosis (H)
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
## 2024-08-06 LAB
ALBUMIN SERPL BCG-MCNC: 4.8 G/DL (ref 3.5–5.2)
ALP SERPL-CCNC: 73 U/L (ref 40–150)
ALT SERPL W P-5'-P-CCNC: 89 U/L (ref 0–70)
AST SERPL W P-5'-P-CCNC: 35 U/L (ref 0–45)
BILIRUB DIRECT SERPL-MCNC: <0.2 MG/DL (ref 0–0.3)
BILIRUB SERPL-MCNC: 0.4 MG/DL
CHOLEST SERPL-MCNC: 202 MG/DL
HDLC SERPL-MCNC: 33 MG/DL
LDLC SERPL CALC-MCNC: 109 MG/DL
LITHIUM SERPL-SCNC: 0.49 MMOL/L (ref 0.6–1.2)
NONHDLC SERPL-MCNC: 169 MG/DL
PROT SERPL-MCNC: 7.2 G/DL (ref 6.4–8.3)
TRIGL SERPL-MCNC: 301 MG/DL

## 2024-08-06 PROCEDURE — 80061 LIPID PANEL: CPT | Performed by: PSYCHIATRY & NEUROLOGY

## 2024-08-06 PROCEDURE — 97150 GROUP THERAPEUTIC PROCEDURES: CPT | Mod: GO

## 2024-08-06 PROCEDURE — 36415 COLL VENOUS BLD VENIPUNCTURE: CPT | Performed by: CLINICAL NURSE SPECIALIST

## 2024-08-06 PROCEDURE — 80178 ASSAY OF LITHIUM: CPT | Performed by: CLINICAL NURSE SPECIALIST

## 2024-08-06 PROCEDURE — 124N000002 HC R&B MH UMMC

## 2024-08-06 PROCEDURE — 80076 HEPATIC FUNCTION PANEL: CPT | Performed by: PSYCHIATRY & NEUROLOGY

## 2024-08-06 PROCEDURE — 250N000013 HC RX MED GY IP 250 OP 250 PS 637: Performed by: CLINICAL NURSE SPECIALIST

## 2024-08-06 PROCEDURE — 99232 SBSQ HOSP IP/OBS MODERATE 35: CPT | Performed by: CLINICAL NURSE SPECIALIST

## 2024-08-06 RX ORDER — LITHIUM CARBONATE 300 MG/1
600 TABLET, FILM COATED, EXTENDED RELEASE ORAL ONCE
Status: COMPLETED | OUTPATIENT
Start: 2024-08-06 | End: 2024-08-06

## 2024-08-06 RX ORDER — LITHIUM CARBONATE 450 MG
450 TABLET, EXTENDED RELEASE ORAL
Status: DISCONTINUED | OUTPATIENT
Start: 2024-08-07 | End: 2024-08-14 | Stop reason: HOSPADM

## 2024-08-06 RX ADMIN — BENZTROPINE MESYLATE 0.5 MG: 0.5 TABLET ORAL at 09:08

## 2024-08-06 RX ADMIN — ACETAMINOPHEN 650 MG: 325 TABLET, FILM COATED ORAL at 02:11

## 2024-08-06 RX ADMIN — ATOMOXETINE 100 MG: 100 CAPSULE ORAL at 09:08

## 2024-08-06 RX ADMIN — QUETIAPINE FUMARATE 200 MG: 200 TABLET ORAL at 21:11

## 2024-08-06 RX ADMIN — LEVETIRACETAM 1000 MG: 500 TABLET, FILM COATED ORAL at 19:19

## 2024-08-06 RX ADMIN — ACYCLOVIR 800 MG: 400 TABLET ORAL at 19:19

## 2024-08-06 RX ADMIN — GABAPENTIN 300 MG: 300 CAPSULE ORAL at 19:19

## 2024-08-06 RX ADMIN — LITHIUM CARBONATE 600 MG: 300 TABLET, EXTENDED RELEASE ORAL at 19:19

## 2024-08-06 RX ADMIN — LEVETIRACETAM 1000 MG: 500 TABLET, FILM COATED ORAL at 09:08

## 2024-08-06 RX ADMIN — LEVETIRACETAM 250 MG: 250 TABLET, FILM COATED ORAL at 09:09

## 2024-08-06 RX ADMIN — GABAPENTIN 300 MG: 300 CAPSULE ORAL at 09:09

## 2024-08-06 RX ADMIN — PROPRANOLOL HYDROCHLORIDE 10 MG: 10 TABLET ORAL at 09:08

## 2024-08-06 RX ADMIN — LEVETIRACETAM 250 MG: 250 TABLET, FILM COATED ORAL at 19:21

## 2024-08-06 RX ADMIN — ACYCLOVIR 800 MG: 400 TABLET ORAL at 09:08

## 2024-08-06 RX ADMIN — GABAPENTIN 300 MG: 300 CAPSULE ORAL at 13:08

## 2024-08-06 RX ADMIN — NICOTINE POLACRILEX 4 MG: 4 GUM, CHEWING BUCCAL at 09:09

## 2024-08-06 RX ADMIN — NICOTINE POLACRILEX 4 MG: 4 GUM, CHEWING BUCCAL at 19:01

## 2024-08-06 RX ADMIN — PALIPERIDONE 3 MG: 3 TABLET, EXTENDED RELEASE ORAL at 09:08

## 2024-08-06 RX ADMIN — NICOTINE POLACRILEX 4 MG: 4 GUM, CHEWING BUCCAL at 14:06

## 2024-08-06 RX ADMIN — PROPRANOLOL HYDROCHLORIDE 10 MG: 10 TABLET ORAL at 19:19

## 2024-08-06 RX ADMIN — Medication 25 MCG: at 09:09

## 2024-08-06 ASSESSMENT — ACTIVITIES OF DAILY LIVING (ADL)
ORAL_HYGIENE: INDEPENDENT
ADLS_ACUITY_SCORE: 28
ADLS_ACUITY_SCORE: 28
HYGIENE/GROOMING: INDEPENDENT
ADLS_ACUITY_SCORE: 28
DRESS: INDEPENDENT;SCRUBS (BEHAVIORAL HEALTH)
ADLS_ACUITY_SCORE: 28
ADLS_ACUITY_SCORE: 28

## 2024-08-06 NOTE — PROGRESS NOTES
"  Rehab Group    Start time: 1330  End time: 1415  Patient time total: 45 minutes    came in and out of group session    #2 attended   Group Type: dance movement   Group Topic Covered: coping skills  Organizing rhythms     Group Session Detail:  The group focused on self-regulation within a disruptive environment with some releasing through music and gentle movement.  There were several options for self-determination and control to demonstrate safety and enjoyment despite life not being what is wanted at the moment.  Perspective came through creative self-expression.       Patient Response/Contribution:  supportive of peers, listened actively, and engaged socially when prompted       Patient Detail:    Pt moved in and out of the group appearing conflict-avoidant with a \"floating\" appearance but socially appropriate manner (self-protection/guarded.)  He did appear to stare off to the upper left and occasionally had explanations about what was going on for him during some of these times, but other times had limited insight or didn't share, but he appeared perplexed cautious or concerned.  This behavior was not in response to the explosive behaviors of peers.  He smiled and stated verbally that the session was helpful for him.      No Charge      Patient Active Problem List   Diagnosis    Psychosis, unspecified psychosis type (H)    Intellectual disability    Acute psychosis (H)      "

## 2024-08-06 NOTE — PLAN OF CARE
Patient presents as quiet, guarded, and socially withdrawn with a flat affect.  Edward appears internally preoccupied with poverty of thought and a vacant stare; however, he denies that he is experiencing any psychotic symptoms.  Edward has been intermittently visible in the milieu; however, he keeps to himself and is rarely seen socializing with others.  He has been behaviorally controlled over the balance of this day shift with no episodes of agitation or aggressive behavior noted.  He denies any self harm or aggressive ideations at this time.  Edward accepted all of his scheduled medications without incident this shift, and no adverse side effects have been reported or observed.  His lithium level came back subtherapeutic this AM, and RN writer updated Dr. Bowens; the daily lithium dose will be increased from 600 mg to 450 mg BID and this will start tomorrow.  He denies any acute physical concerns at this time.  BP (!) 141/89 (Patient Position: Sitting)   Pulse 85   Temp 97  F (36.1  C) (Temporal)   Resp 16   Ht 1.829 m (6')   Wt 101.9 kg (224 lb 9.6 oz)   SpO2 98%   BMI 30.46 kg/m

## 2024-08-06 NOTE — PLAN OF CARE
Calm, cooperative. Upon assessment, pt denied anxiety, depression, SI/HI. Did endorse hallucinations, but stated that he could not elaborate on this. Gave one word answers to assessment questions. Presents as withdrawn, guarded. Not observed socializing, isolating to room. No acute behavioral concerns noted this shift.     Came to medication window to request scheduled medication.     Pt endorses pain, but stated that he could not rate. Denies having any acute physical concerns at this time.    /82 (Cuff Size: Adult Large)   Pulse 87   Temp 97.1  F (36.2  C) (Temporal)   Resp 16   Ht 1.829 m (6')   Wt 101.9 kg (224 lb 9.6 oz)   SpO2 97%   BMI 30.46 kg/m

## 2024-08-06 NOTE — PLAN OF CARE
"  Rehab Group    Start time: 1045  End time: 1145  Patient time total: 25 minutes    attended partial group     #3 attended   Group Type: OT Clinic   Group Topic Covered: coping skills     Group Session Detail:    Intervention:  Pt participated in a OT Clinic group to facilitate coping skills exploration and creative expression through personally meaningful activities, and to encourage utilization of these healthy coping skills to promote overall health and wellness. Group included clinical observation of social, cognitive and kinesthetic performance skills to inform treatment and safe discharge planning.    Mood/Affect: Pleasant      Plan: Patient encouraged to maintain attendance for continued ongoing support in working towards occupational therapy goals to support overall treatment/care.        Patient Detail:    Joined for group for partial time after encouragement from writer. Initially hesitant to participate, however agreeable to work on writer selected task for period of time. Made minimal progress before stopping and wanting to sit and listen to the music and relax.   Was social off and on with writer during this time, most often when writer initiated the conversation.   Much of this time patient spent staring. At times when writer would ask patient a questions, would smile and take extended time to respond. At one point stating \"this is going to sound weird but you have pretty hands. 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 Proceeding to show writer how one of his finger nail beds was flat.       [id number] OT Group (2 or more in attendance)    Patient Active Problem List   Diagnosis    Psychosis, unspecified psychosis type (H)    Intellectual disability    Acute psychosis (H)        "
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
## 2024-08-06 NOTE — PLAN OF CARE
NOC Shift Report    Pt appeared to have slept 5 hours without respiratory distress.    Pt awake at start of shift. He came out of his room for a snack. Pt was quiet and affect was flat. Pt denied pain or discomfort at that time. Declined PRN for sleep. Pt went back to his room.     0211 Pt requested and received prn of Tylenol 650 mg for abdominal pain. Rated 5/10. Pt could not elaborate on his pain symptoms.    0300 Upon reassessment for pain, pt was asleep.    Pt NPO overnight for AM labs and lithium level.    Goal Outcome Evaluation:  Problem: Sleep Disturbance  Goal: Adequate Sleep/Rest  Outcome: Progressing
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
## 2024-08-06 NOTE — PLAN OF CARE

## 2024-08-07 PROCEDURE — 124N000002 HC R&B MH UMMC

## 2024-08-07 PROCEDURE — 99232 SBSQ HOSP IP/OBS MODERATE 35: CPT | Performed by: CLINICAL NURSE SPECIALIST

## 2024-08-07 PROCEDURE — 250N000013 HC RX MED GY IP 250 OP 250 PS 637: Performed by: CLINICAL NURSE SPECIALIST

## 2024-08-07 RX ADMIN — ACYCLOVIR 800 MG: 400 TABLET ORAL at 19:35

## 2024-08-07 RX ADMIN — ATOMOXETINE 100 MG: 100 CAPSULE ORAL at 07:56

## 2024-08-07 RX ADMIN — NICOTINE POLACRILEX 4 MG: 4 GUM, CHEWING BUCCAL at 17:05

## 2024-08-07 RX ADMIN — ACETAMINOPHEN 650 MG: 325 TABLET, FILM COATED ORAL at 06:32

## 2024-08-07 RX ADMIN — GABAPENTIN 300 MG: 300 CAPSULE ORAL at 19:35

## 2024-08-07 RX ADMIN — ACYCLOVIR 800 MG: 400 TABLET ORAL at 07:56

## 2024-08-07 RX ADMIN — BENZTROPINE MESYLATE 0.5 MG: 0.5 TABLET ORAL at 07:56

## 2024-08-07 RX ADMIN — QUETIAPINE FUMARATE 200 MG: 200 TABLET ORAL at 19:35

## 2024-08-07 RX ADMIN — LITHIUM CARBONATE 450 MG: 450 TABLET, EXTENDED RELEASE ORAL at 19:35

## 2024-08-07 RX ADMIN — LEVETIRACETAM 1000 MG: 500 TABLET, FILM COATED ORAL at 19:36

## 2024-08-07 RX ADMIN — GABAPENTIN 300 MG: 300 CAPSULE ORAL at 07:57

## 2024-08-07 RX ADMIN — NICOTINE POLACRILEX 4 MG: 4 GUM, CHEWING BUCCAL at 19:39

## 2024-08-07 RX ADMIN — Medication 25 MCG: at 07:57

## 2024-08-07 RX ADMIN — PROPRANOLOL HYDROCHLORIDE 10 MG: 10 TABLET ORAL at 07:57

## 2024-08-07 RX ADMIN — NICOTINE POLACRILEX 4 MG: 4 GUM, CHEWING BUCCAL at 14:22

## 2024-08-07 RX ADMIN — TRAZODONE HYDROCHLORIDE 50 MG: 50 TABLET ORAL at 19:34

## 2024-08-07 RX ADMIN — PROPRANOLOL HYDROCHLORIDE 10 MG: 10 TABLET ORAL at 19:35

## 2024-08-07 RX ADMIN — LEVETIRACETAM 250 MG: 250 TABLET, FILM COATED ORAL at 07:58

## 2024-08-07 RX ADMIN — PALIPERIDONE 3 MG: 3 TABLET, EXTENDED RELEASE ORAL at 07:56

## 2024-08-07 RX ADMIN — GABAPENTIN 300 MG: 300 CAPSULE ORAL at 14:22

## 2024-08-07 RX ADMIN — LEVETIRACETAM 250 MG: 250 TABLET, FILM COATED ORAL at 19:36

## 2024-08-07 RX ADMIN — LITHIUM CARBONATE 450 MG: 450 TABLET, EXTENDED RELEASE ORAL at 07:57

## 2024-08-07 RX ADMIN — LEVETIRACETAM 1000 MG: 500 TABLET, FILM COATED ORAL at 07:56

## 2024-08-07 ASSESSMENT — ACTIVITIES OF DAILY LIVING (ADL)
ADLS_ACUITY_SCORE: 28
ORAL_HYGIENE: INDEPENDENT
ADLS_ACUITY_SCORE: 28
HYGIENE/GROOMING: INDEPENDENT
ADLS_ACUITY_SCORE: 28
HYGIENE/GROOMING: INDEPENDENT
ADLS_ACUITY_SCORE: 28
DRESS: INDEPENDENT
ADLS_ACUITY_SCORE: 28
LAUNDRY: UNABLE TO COMPLETE
ADLS_ACUITY_SCORE: 28

## 2024-08-07 NOTE — PLAN OF CARE
BEH IP Unit Acuity Rating Score (UARS)  Patient is given one point for every criteria they meet.    CRITERIA SCORING   On a 72 hour hold, court hold, committed, stay of commitment, or revocation. 1    Patient LOS on BEH unit exceeds 20 days. 0  LOS: 7   Patient under guardianship, 55+, otherwise medically complex, or under age 11. 0   Suicide ideation without relief of precipitating factors. 0   Current plan for suicide. 0   Current plan for homicide. 0   Imminent risk or actual attempt to seriously harm another without relief of factors precipitating the attempt. 0   Severe dysfunction in daily living (ex: complete neglect for self care, extreme disruption in vegetative function, extreme deterioration in social interactions). 1   Recent (last 7 days) or current physical aggression in the ED or on unit. 0   Restraints or seclusion episode in past 72 hours. 0   Recent (last 7 days) or current verbal aggression, agitation, yelling, etc., while in the ED or unit. 0   Active psychosis. 1   Need for constant or near constant redirection (from leaving, from others, etc).  0   Intrusive or disruptive behaviors. 0   Patient requires 3 or more hours of individualized nursing care per 8-hour shift (i.e. for ADLs, meds, therapeutic interventions). 0   TOTAL 3
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
## 2024-08-07 NOTE — PLAN OF CARE
Pt had an uneventful shift. Pt mood presents as somber with flat affect. Pt tolerated nursing assessment, paucity of speech noted, pt denies SI, SIB, HI and thoughts of hurting others. Pt denies depression, anxiety and A/VH. Eye contact hesitant, pt tends to stand and stare at objects and staff. Pt is asocial and isolated to room. Pt is independent to ask for scheduled medications. No physical complaints this shift. No PRNs given. Pt makes needs known. Pt observed brushing teeth while walking in the hallway on two occasions.    VS reviewed: /84 (BP Location: Right arm)   Pulse 79   Temp 96.8  F (36  C) (Temporal)   Resp 16   Ht 1.829 m (6')   Wt 101.9 kg (224 lb 9.6 oz)   SpO2 100%   BMI 30.46 kg/m   . Patient denies  pain.    Length of stay: 7

## 2024-08-07 NOTE — PLAN OF CARE
Problem: Sleep Disturbance  Goal: Adequate Sleep/Rest  Outcome: Progressing   Goal Outcome Evaluation:    Patient appeared to sleep 6 hours this night shift.  No prns or snacks given or requested.  No concerns were reported or noted.      At 0630, Yogesh took Tylenol for SHRESTHA.

## 2024-08-07 NOTE — PROGRESS NOTES
"Patient has been isolative to himself and he avoids eye contact when talking with staff. He does not socialize with peers or engage in conversations. He denies having any thoughts of wanting to harm himself or others, anxiety, depression, visual hallucinations/auditory hallucinations. He does report have neck pain but did not want to take any medications to help with the pain. He came to the nursing station and requested for the writer to check for this temperature. His temperature was 97.3, and his vitals are stable. He is medication compliant, and no behavioral concerns were noted during this shift. Patient knocked on the nursing station door and when the writer responded to the patient asked what he was looking for he responded \"  I just want to hear your voice, I carla your voice and I am bored\". Patient has a tendency of staring at staff, and patient has been observed inappropriately laughing by himself.    Blood pressure 103/78, pulse 98, temperature 97.4  F (36.3  C), temperature source Temporal, resp. rate 16, height 1.829 m (6'), weight 101.9 kg (224 lb 9.6 oz), SpO2 100%.    "

## 2024-08-07 NOTE — PROGRESS NOTES
CLINICAL NUTRITION SERVICES - BRIEF NOTE     Nutrition Prescription    Recommendations already ordered by Registered Dietitian (RD):  None at this time - pt declined to speak with RD    Future/Additional Recommendations:  RD to sign off at this time, but will remain available by consult if new nutrition problem arises.       REASON FOR ASSESSMENT  Edward Resendez is a/an 24 year old male assessed by the dietitian for Provider Order - abnormal cholesterol.     Findings  RD attempted to provide pt with nutrition education, pt declined and hung up on writer. Writer is available by consult if pt changes his mind or has any other requests or concerns.      Latest Reference Range & Units 08/06/24 08:30   Cholesterol <200 mg/dL 202 (H)   HDL Cholesterol >=40 mg/dL 33 (L)   LDL Cholesterol Calculated <=100 mg/dL 109 (H)   Non HDL Cholesterol <130 mg/dL 169 (H)   Triglycerides <150 mg/dL 301 (H)       INTERVENTIONS  Implementation  Nutrition Education: RD role in care   Nutrition education for nutrition relationship to health/disease      Follow up/Monitoring  RD to sign off at this time, but will remain available by consult if new nutrition problem arises.      Samantha Whitehead, MPH, RDN, LD  Behavioral Health Adult & Pediatric Dietitian  BEH Clinical Dietitian Vocera, Teams, or Desk: [phone]  Weekend/Holiday Vocera: Weekend Holiday Clinical Dietitian [Multi Site Groups]

## 2024-08-08 PROCEDURE — 124N000002 HC R&B MH UMMC

## 2024-08-08 PROCEDURE — 250N000013 HC RX MED GY IP 250 OP 250 PS 637: Performed by: CLINICAL NURSE SPECIALIST

## 2024-08-08 PROCEDURE — 99232 SBSQ HOSP IP/OBS MODERATE 35: CPT | Performed by: CLINICAL NURSE SPECIALIST

## 2024-08-08 RX ORDER — ACYCLOVIR 400 MG/1
400 TABLET ORAL 2 TIMES DAILY
Status: DISCONTINUED | OUTPATIENT
Start: 2024-08-08 | End: 2024-08-14 | Stop reason: HOSPADM

## 2024-08-08 RX ADMIN — GABAPENTIN 300 MG: 300 CAPSULE ORAL at 14:05

## 2024-08-08 RX ADMIN — NICOTINE POLACRILEX 4 MG: 4 GUM, CHEWING BUCCAL at 18:16

## 2024-08-08 RX ADMIN — NICOTINE POLACRILEX 4 MG: 4 GUM, CHEWING BUCCAL at 10:12

## 2024-08-08 RX ADMIN — GABAPENTIN 300 MG: 300 CAPSULE ORAL at 07:39

## 2024-08-08 RX ADMIN — BENZTROPINE MESYLATE 0.5 MG: 0.5 TABLET ORAL at 07:40

## 2024-08-08 RX ADMIN — ACYCLOVIR 800 MG: 400 TABLET ORAL at 07:39

## 2024-08-08 RX ADMIN — NICOTINE POLACRILEX 4 MG: 4 GUM, CHEWING BUCCAL at 20:12

## 2024-08-08 RX ADMIN — PALIPERIDONE 3 MG: 3 TABLET, EXTENDED RELEASE ORAL at 07:39

## 2024-08-08 RX ADMIN — GABAPENTIN 300 MG: 300 CAPSULE ORAL at 20:09

## 2024-08-08 RX ADMIN — ATOMOXETINE 100 MG: 100 CAPSULE ORAL at 07:39

## 2024-08-08 RX ADMIN — NICOTINE POLACRILEX 4 MG: 4 GUM, CHEWING BUCCAL at 16:32

## 2024-08-08 RX ADMIN — LEVETIRACETAM 250 MG: 250 TABLET, FILM COATED ORAL at 20:09

## 2024-08-08 RX ADMIN — Medication 25 MCG: at 07:40

## 2024-08-08 RX ADMIN — LITHIUM CARBONATE 450 MG: 450 TABLET, EXTENDED RELEASE ORAL at 20:08

## 2024-08-08 RX ADMIN — LEVETIRACETAM 250 MG: 250 TABLET, FILM COATED ORAL at 07:40

## 2024-08-08 RX ADMIN — PROPRANOLOL HYDROCHLORIDE 10 MG: 10 TABLET ORAL at 07:40

## 2024-08-08 RX ADMIN — PROPRANOLOL HYDROCHLORIDE 10 MG: 10 TABLET ORAL at 20:09

## 2024-08-08 RX ADMIN — QUETIAPINE FUMARATE 200 MG: 200 TABLET ORAL at 20:08

## 2024-08-08 RX ADMIN — LITHIUM CARBONATE 450 MG: 450 TABLET, EXTENDED RELEASE ORAL at 07:39

## 2024-08-08 RX ADMIN — ACYCLOVIR 400 MG: 400 TABLET ORAL at 20:09

## 2024-08-08 RX ADMIN — LEVETIRACETAM 1000 MG: 500 TABLET, FILM COATED ORAL at 20:09

## 2024-08-08 RX ADMIN — LEVETIRACETAM 1000 MG: 500 TABLET, FILM COATED ORAL at 07:39

## 2024-08-08 ASSESSMENT — ACTIVITIES OF DAILY LIVING (ADL)
HYGIENE/GROOMING: INDEPENDENT
ADLS_ACUITY_SCORE: 28
DRESS: INDEPENDENT;SCRUBS (BEHAVIORAL HEALTH)
ADLS_ACUITY_SCORE: 28
ORAL_HYGIENE: INDEPENDENT

## 2024-08-08 NOTE — PLAN OF CARE
Nursing assessment completed. Patient visible in the lounge at times, but mostly isolate and withdrawn to his room through out the evening. Presents as calm and cooperative with a blunted affect. Guarded. Requests the phone number for a facility he is looking for a check at. Does not offer more information. Denies SI/SIB or thoughts to harm others. Cooperative with HS medications. Requests and receives PRN trazodone with HS meds for sleep. States it is helpful. VSS. Continue to monitor and assess.
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
## 2024-08-08 NOTE — PLAN OF CARE
Pt had an uneventful shift today. No change in presentation. Pt continues to be isolated, asocial. Mood is somber with flat affect. Pt is intermittently visible in the milieu. Pt will approach medication window and when greeted, pt will stare and walk away without making needs known. Pt is medication compliant. PRNs given this shift: nicotine gum x1.    Pt denies SI, SIB, HI and thoughts of hurting others. Pt denies depression, anxiety and AV/H.    Pt ate 100% of breakfast and lunch. No behavioral outbursts reported or observed.

## 2024-08-08 NOTE — PLAN OF CARE
BEH IP Unit Acuity Rating Score (UARS)  Patient is given one point for every criteria they meet.    CRITERIA SCORING   On a 72 hour hold, court hold, committed, stay of commitment, or revocation. 1    Patient LOS on BEH unit exceeds 20 days. 0  LOS: 8   Patient under guardianship, 55+, otherwise medically complex, or under age 11. 0   Suicide ideation without relief of precipitating factors. 0   Current plan for suicide. 0   Current plan for homicide. 0   Imminent risk or actual attempt to seriously harm another without relief of factors precipitating the attempt. 0   Severe dysfunction in daily living (ex: complete neglect for self care, extreme disruption in vegetative function, extreme deterioration in social interactions). 1   Recent (last 7 days) or current physical aggression in the ED or on unit. 0   Restraints or seclusion episode in past 72 hours. 0   Recent (last 7 days) or current verbal aggression, agitation, yelling, etc., while in the ED or unit. 0   Active psychosis. 1   Need for constant or near constant redirection (from leaving, from others, etc).  0   Intrusive or disruptive behaviors. 0   Patient requires 3 or more hours of individualized nursing care per 8-hour shift (i.e. for ADLs, meds, therapeutic interventions). 0   TOTAL 3
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
## 2024-08-09 PROCEDURE — 250N000013 HC RX MED GY IP 250 OP 250 PS 637: Performed by: CLINICAL NURSE SPECIALIST

## 2024-08-09 PROCEDURE — 99232 SBSQ HOSP IP/OBS MODERATE 35: CPT | Performed by: CLINICAL NURSE SPECIALIST

## 2024-08-09 PROCEDURE — 124N000002 HC R&B MH UMMC

## 2024-08-09 PROCEDURE — H2032 ACTIVITY THERAPY, PER 15 MIN: HCPCS

## 2024-08-09 RX ORDER — PALIPERIDONE 3 MG/1
6 TABLET, EXTENDED RELEASE ORAL DAILY
Status: DISCONTINUED | OUTPATIENT
Start: 2024-08-10 | End: 2024-08-09

## 2024-08-09 RX ORDER — HALOPERIDOL 5 MG/ML
5 INJECTION INTRAMUSCULAR DAILY
Status: DISCONTINUED | OUTPATIENT
Start: 2024-08-10 | End: 2024-08-14 | Stop reason: HOSPADM

## 2024-08-09 RX ORDER — HALOPERIDOL 5 MG/ML
5 INJECTION INTRAMUSCULAR DAILY
Status: DISCONTINUED | OUTPATIENT
Start: 2024-08-10 | End: 2024-08-09

## 2024-08-09 RX ORDER — PALIPERIDONE 6 MG/1
6 TABLET, EXTENDED RELEASE ORAL DAILY
Status: DISCONTINUED | OUTPATIENT
Start: 2024-08-10 | End: 2024-08-14 | Stop reason: HOSPADM

## 2024-08-09 RX ADMIN — QUETIAPINE FUMARATE 200 MG: 200 TABLET ORAL at 19:08

## 2024-08-09 RX ADMIN — PROPRANOLOL HYDROCHLORIDE 10 MG: 10 TABLET ORAL at 08:28

## 2024-08-09 RX ADMIN — ACYCLOVIR 400 MG: 400 TABLET ORAL at 08:28

## 2024-08-09 RX ADMIN — ATOMOXETINE 100 MG: 100 CAPSULE ORAL at 08:28

## 2024-08-09 RX ADMIN — GABAPENTIN 300 MG: 300 CAPSULE ORAL at 13:48

## 2024-08-09 RX ADMIN — NICOTINE POLACRILEX 4 MG: 4 GUM, CHEWING BUCCAL at 19:43

## 2024-08-09 RX ADMIN — GABAPENTIN 300 MG: 300 CAPSULE ORAL at 19:07

## 2024-08-09 RX ADMIN — LEVETIRACETAM 1000 MG: 500 TABLET, FILM COATED ORAL at 19:06

## 2024-08-09 RX ADMIN — PROPRANOLOL HYDROCHLORIDE 10 MG: 10 TABLET ORAL at 19:05

## 2024-08-09 RX ADMIN — GABAPENTIN 300 MG: 300 CAPSULE ORAL at 08:28

## 2024-08-09 RX ADMIN — ACETAMINOPHEN 650 MG: 325 TABLET, FILM COATED ORAL at 09:45

## 2024-08-09 RX ADMIN — LITHIUM CARBONATE 450 MG: 450 TABLET, EXTENDED RELEASE ORAL at 19:08

## 2024-08-09 RX ADMIN — NICOTINE POLACRILEX 4 MG: 4 GUM, CHEWING BUCCAL at 08:49

## 2024-08-09 RX ADMIN — ACYCLOVIR 400 MG: 400 TABLET ORAL at 19:06

## 2024-08-09 RX ADMIN — ACETAMINOPHEN 650 MG: 325 TABLET, FILM COATED ORAL at 14:31

## 2024-08-09 RX ADMIN — LEVETIRACETAM 250 MG: 250 TABLET, FILM COATED ORAL at 08:28

## 2024-08-09 RX ADMIN — LEVETIRACETAM 1000 MG: 500 TABLET, FILM COATED ORAL at 08:28

## 2024-08-09 RX ADMIN — PALIPERIDONE 3 MG: 3 TABLET, EXTENDED RELEASE ORAL at 08:28

## 2024-08-09 RX ADMIN — BENZTROPINE MESYLATE 0.5 MG: 0.5 TABLET ORAL at 08:28

## 2024-08-09 RX ADMIN — Medication 25 MCG: at 08:28

## 2024-08-09 RX ADMIN — LITHIUM CARBONATE 450 MG: 450 TABLET, EXTENDED RELEASE ORAL at 08:29

## 2024-08-09 RX ADMIN — LEVETIRACETAM 250 MG: 250 TABLET, FILM COATED ORAL at 19:12

## 2024-08-09 ASSESSMENT — ACTIVITIES OF DAILY LIVING (ADL)
HYGIENE/GROOMING: INDEPENDENT
LAUNDRY: UNABLE TO COMPLETE
ORAL_HYGIENE: INDEPENDENT
ADLS_ACUITY_SCORE: 28
ORAL_HYGIENE: INDEPENDENT
ADLS_ACUITY_SCORE: 28
ADLS_ACUITY_SCORE: 28
DRESS: INDEPENDENT
ADLS_ACUITY_SCORE: 28
HYGIENE/GROOMING: INDEPENDENT
ADLS_ACUITY_SCORE: 28
LAUNDRY: UNABLE TO COMPLETE
ADLS_ACUITY_SCORE: 28
DRESS: INDEPENDENT
ADLS_ACUITY_SCORE: 28

## 2024-08-09 NOTE — PLAN OF CARE
Goal Outcome Evaluation:  Problem: Sleep Disturbance  Goal: Adequate Sleep/Rest  Outcome: Progressing      Pt in bed at beginning of shift, breathing quiet and unlabored. Pt slept through shift. Pt slept 7 hours.      No pt complaints or concerns at this time.      No PRNs given. Will continue to monitor.
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
## 2024-08-09 NOTE — PROGRESS NOTES
Rehab Group     Start time: 1115  End time: 1200  Patient time total: 20 minutes     attended partial group     #2 attended   Group Type: music   Group Topic Covered: balanced lifestyle, cognitive activities, and healthy leisure time      Group Session Detail: Participated in Music Therapy intervention of [employer] today.  Goals of session were focusing, memory recall, positive distraction, emotional containment and social cohesion.    Patient Response/Contribution:  short attention span to intervention   Patient Detail: Pt began group participating in Music [place] intervention, but became distracted and engaged in listening to the music while writing Estonian characters later on in session.   Calm and kept to self.      Activity Therapy Per 15 min ()    Patient Active Problem List   Diagnosis    Psychosis, unspecified psychosis type (H)    Intellectual disability    Acute psychosis (H)

## 2024-08-09 NOTE — PLAN OF CARE
BEH IP Unit Acuity Rating Score (UARS)  Patient is given one point for every criteria they meet.    CRITERIA SCORING   On a 72 hour hold, court hold, committed, stay of commitment, or revocation. 1    Patient LOS on BEH unit exceeds 20 days. 0  LOS: 9   Patient under guardianship, 55+, otherwise medically complex, or under age 11. 0   Suicide ideation without relief of precipitating factors. 0   Current plan for suicide. 0   Current plan for homicide. 0   Imminent risk or actual attempt to seriously harm another without relief of factors precipitating the attempt. 0   Severe dysfunction in daily living (ex: complete neglect for self care, extreme disruption in vegetative function, extreme deterioration in social interactions). 1   Recent (last 7 days) or current physical aggression in the ED or on unit. 0   Restraints or seclusion episode in past 72 hours. 0   Recent (last 7 days) or current verbal aggression, agitation, yelling, etc., while in the ED or unit. 0   Active psychosis. 1   Need for constant or near constant redirection (from leaving, from others, etc).  0   Intrusive or disruptive behaviors. 0   Patient requires 3 or more hours of individualized nursing care per 8-hour shift (i.e. for ADLs, meds, therapeutic interventions). 0   TOTAL 3
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
## 2024-08-09 NOTE — PLAN OF CARE
Pt presents as withdrawn, asocial. Stares at times. Flat affect. Paucity of speech. Response lag.  Denies all psychiatric symptoms.   No acute behavioral concerns noted.   Denies pain. Denies having any acute physical concerns at this time.   Come to medication window to take scheduled medication, which pt took without incident.     /82 (BP Location: Left arm, Patient Position: Standing)   Pulse 98   Temp 97.1  F (36.2  C) (Temporal)   Resp 16   Ht 1.829 m (6')   Wt 101.9 kg (224 lb 9.6 oz)   SpO2 97%   BMI 30.46 kg/m
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
## 2024-08-10 PROCEDURE — 124N000002 HC R&B MH UMMC

## 2024-08-10 PROCEDURE — 250N000013 HC RX MED GY IP 250 OP 250 PS 637: Performed by: CLINICAL NURSE SPECIALIST

## 2024-08-10 RX ADMIN — LEVETIRACETAM 250 MG: 250 TABLET, FILM COATED ORAL at 08:21

## 2024-08-10 RX ADMIN — LEVETIRACETAM 1000 MG: 500 TABLET, FILM COATED ORAL at 19:06

## 2024-08-10 RX ADMIN — QUETIAPINE FUMARATE 200 MG: 200 TABLET ORAL at 19:06

## 2024-08-10 RX ADMIN — Medication 25 MCG: at 08:20

## 2024-08-10 RX ADMIN — NICOTINE POLACRILEX 4 MG: 4 GUM, CHEWING BUCCAL at 14:18

## 2024-08-10 RX ADMIN — ACYCLOVIR 400 MG: 400 TABLET ORAL at 08:20

## 2024-08-10 RX ADMIN — LITHIUM CARBONATE 450 MG: 450 TABLET, EXTENDED RELEASE ORAL at 08:20

## 2024-08-10 RX ADMIN — GABAPENTIN 300 MG: 300 CAPSULE ORAL at 15:00

## 2024-08-10 RX ADMIN — GABAPENTIN 300 MG: 300 CAPSULE ORAL at 19:04

## 2024-08-10 RX ADMIN — LEVETIRACETAM 1000 MG: 500 TABLET, FILM COATED ORAL at 08:20

## 2024-08-10 RX ADMIN — ACYCLOVIR 400 MG: 400 TABLET ORAL at 19:05

## 2024-08-10 RX ADMIN — NICOTINE POLACRILEX 4 MG: 4 GUM, CHEWING BUCCAL at 13:04

## 2024-08-10 RX ADMIN — ATOMOXETINE 100 MG: 100 CAPSULE ORAL at 08:20

## 2024-08-10 RX ADMIN — PALIPERIDONE 6 MG: 6 TABLET, EXTENDED RELEASE ORAL at 08:27

## 2024-08-10 RX ADMIN — PROPRANOLOL HYDROCHLORIDE 10 MG: 10 TABLET ORAL at 19:04

## 2024-08-10 RX ADMIN — ACETAMINOPHEN 650 MG: 325 TABLET, FILM COATED ORAL at 08:42

## 2024-08-10 RX ADMIN — NICOTINE POLACRILEX 4 MG: 4 GUM, CHEWING BUCCAL at 17:27

## 2024-08-10 RX ADMIN — NICOTINE POLACRILEX 4 MG: 4 GUM, CHEWING BUCCAL at 09:39

## 2024-08-10 RX ADMIN — BENZTROPINE MESYLATE 0.5 MG: 0.5 TABLET ORAL at 08:21

## 2024-08-10 RX ADMIN — TRAZODONE HYDROCHLORIDE 50 MG: 50 TABLET ORAL at 21:07

## 2024-08-10 RX ADMIN — GABAPENTIN 300 MG: 300 CAPSULE ORAL at 08:21

## 2024-08-10 RX ADMIN — LEVETIRACETAM 250 MG: 250 TABLET, FILM COATED ORAL at 19:10

## 2024-08-10 RX ADMIN — LITHIUM CARBONATE 450 MG: 450 TABLET, EXTENDED RELEASE ORAL at 19:05

## 2024-08-10 RX ADMIN — PROPRANOLOL HYDROCHLORIDE 10 MG: 10 TABLET ORAL at 08:21

## 2024-08-10 RX ADMIN — ACETAMINOPHEN 650 MG: 325 TABLET, FILM COATED ORAL at 18:03

## 2024-08-10 ASSESSMENT — ACTIVITIES OF DAILY LIVING (ADL)
ADLS_ACUITY_SCORE: 28
DRESS: INDEPENDENT
ADLS_ACUITY_SCORE: 28
ORAL_HYGIENE: INDEPENDENT
ADLS_ACUITY_SCORE: 28
ADLS_ACUITY_SCORE: 28
LAUNDRY: UNABLE TO COMPLETE
HYGIENE/GROOMING: INDEPENDENT
ADLS_ACUITY_SCORE: 28
HYGIENE/GROOMING: INDEPENDENT
DRESS: INDEPENDENT
ADLS_ACUITY_SCORE: 28
ORAL_HYGIENE: INDEPENDENT
ADLS_ACUITY_SCORE: 28
LAUNDRY: UNABLE TO COMPLETE
ADLS_ACUITY_SCORE: 28

## 2024-08-10 NOTE — PLAN OF CARE
Problem: Psychotic Signs/Symptoms  Goal: Optimal Cognitive Function (Psychotic Signs/Symptoms)  Outcome: Progressing   Goal Outcome Evaluation:         Throughout this shift, the patient mostly stayed in his room, resting in bed. He was withdrawn and isolative, and declined to attend group therapy. His mood appeared flat but calm. The patient was visible in the milieu during medication administration and approached the medication window to receive his meds. He was not social with peers when in the milieu. He still displayed paranoid and delusional behavior, with no hypersexual behaviors observed or reported during this shift. His vital signs remained stable. He showered this shift and changed into a clean scrub. His appetite is good, and he is drinking well.

## 2024-08-10 NOTE — PLAN OF CARE
"  Rehab Group    Start time: 11:15  End time: 12:00  Patient time total: 5 minutes    attended partial group    #2 attended   Group Type: occupational therapy and recreation   Group Topic Covered: cognitive activities, coping skills, healthy leisure time, and social skills     Group Session Detail:  Patient passively engaged in a occupational therapy group (Adri) with leisure exploration and engagement being the topic and focus. Leisure exploration offered for increased intrinsic motivation to engage in social situations with peers and exercise cognitive skills.      Patient Response/Contribution:  appeared confused      Patient Detail:  Patient appeared somewhat hesitant to join the group activity; however, agreeable with gentle encouragement. Patient appeared confused by verbal directions of the novel task; didn't seem motivated to put forth effort into learning the unfamiliar activity. Patient opted to leave group early stating, \"this game is depressing me.\" No return. No charge.       No Charge      Patient Active Problem List   Diagnosis    Psychosis, unspecified psychosis type (H)    Intellectual disability    Acute psychosis (H)        "

## 2024-08-10 NOTE — PLAN OF CARE
Edward appeared sleeping  for 7 hours with uneventful night, breathing was quiet and unlabored.  No pain or discomfort verbalized  No aggressive  behaviors demonstrated  No SI/HI, delusions, or hallucination noted or reported this shift.     Problem: Sleep Disturbance  Goal: Adequate Sleep/Rest  Outcome: Progressing     Problem: Adult Behavioral Health Plan of Care  Goal: Plan of Care Review  Outcome: Progressing  Goal: Adheres to Safety Considerations for Self and Others  Intervention: Develop and Maintain Individualized Safety Plan  Recent Flowsheet Documentation  Taken 8/10/2024 0523 by Myriam Palmer, RN  Safety Measures: safety rounds completed  Goal: Absence of New-Onset Illness or Injury  Intervention: Identify and Manage Fall Risk  Recent Flowsheet Documentation  Taken 8/10/2024 0523 by Myriam Palmer, RN  Safety Measures: safety rounds completed   Goal Outcome Evaluation:

## 2024-08-10 NOTE — PLAN OF CARE
Nursing Assessment    Recent Vitals: B/P: 136/86, T: 98.1, P: 93, R: 16     General Shift Summary  Patient has been present in the milieu, in the lounge or present in the fuentes. He is withdrawn and doesn't socialize with staff or peers.  He frequently stares at staff. Patient denies all MH S&S during checking, however check in was very short and patient wouldn't elaborate during conversation. There seems to be poverty of thought. He is eating well. Hygiene is fair. Incite and judgment are limited.    Nevin Morrison, RN MSN

## 2024-08-11 PROCEDURE — 124N000002 HC R&B MH UMMC

## 2024-08-11 PROCEDURE — 250N000013 HC RX MED GY IP 250 OP 250 PS 637: Performed by: CLINICAL NURSE SPECIALIST

## 2024-08-11 RX ADMIN — LITHIUM CARBONATE 450 MG: 450 TABLET, EXTENDED RELEASE ORAL at 19:40

## 2024-08-11 RX ADMIN — NICOTINE POLACRILEX 4 MG: 4 GUM, CHEWING BUCCAL at 17:00

## 2024-08-11 RX ADMIN — ACETAMINOPHEN 650 MG: 325 TABLET, FILM COATED ORAL at 09:06

## 2024-08-11 RX ADMIN — PROPRANOLOL HYDROCHLORIDE 10 MG: 10 TABLET ORAL at 19:40

## 2024-08-11 RX ADMIN — Medication 25 MCG: at 08:04

## 2024-08-11 RX ADMIN — GABAPENTIN 300 MG: 300 CAPSULE ORAL at 08:21

## 2024-08-11 RX ADMIN — LEVETIRACETAM 250 MG: 250 TABLET, FILM COATED ORAL at 19:44

## 2024-08-11 RX ADMIN — PALIPERIDONE 6 MG: 6 TABLET, EXTENDED RELEASE ORAL at 08:20

## 2024-08-11 RX ADMIN — ACYCLOVIR 400 MG: 400 TABLET ORAL at 08:21

## 2024-08-11 RX ADMIN — LEVETIRACETAM 1000 MG: 500 TABLET, FILM COATED ORAL at 19:39

## 2024-08-11 RX ADMIN — ACYCLOVIR 400 MG: 400 TABLET ORAL at 19:40

## 2024-08-11 RX ADMIN — BENZTROPINE MESYLATE 0.5 MG: 0.5 TABLET ORAL at 08:04

## 2024-08-11 RX ADMIN — GABAPENTIN 300 MG: 300 CAPSULE ORAL at 19:39

## 2024-08-11 RX ADMIN — LITHIUM CARBONATE 450 MG: 450 TABLET, EXTENDED RELEASE ORAL at 08:04

## 2024-08-11 RX ADMIN — ACETAMINOPHEN 650 MG: 325 TABLET, FILM COATED ORAL at 17:43

## 2024-08-11 RX ADMIN — NICOTINE POLACRILEX 4 MG: 4 GUM, CHEWING BUCCAL at 08:08

## 2024-08-11 RX ADMIN — QUETIAPINE FUMARATE 200 MG: 200 TABLET ORAL at 19:39

## 2024-08-11 RX ADMIN — PROPRANOLOL HYDROCHLORIDE 10 MG: 10 TABLET ORAL at 08:04

## 2024-08-11 RX ADMIN — NICOTINE POLACRILEX 4 MG: 4 GUM, CHEWING BUCCAL at 11:12

## 2024-08-11 RX ADMIN — ATOMOXETINE 100 MG: 100 CAPSULE ORAL at 08:21

## 2024-08-11 RX ADMIN — TRAZODONE HYDROCHLORIDE 50 MG: 50 TABLET ORAL at 22:23

## 2024-08-11 RX ADMIN — LEVETIRACETAM 1000 MG: 500 TABLET, FILM COATED ORAL at 08:08

## 2024-08-11 RX ADMIN — LEVETIRACETAM 250 MG: 250 TABLET, FILM COATED ORAL at 08:08

## 2024-08-11 RX ADMIN — GABAPENTIN 300 MG: 300 CAPSULE ORAL at 13:49

## 2024-08-11 ASSESSMENT — ACTIVITIES OF DAILY LIVING (ADL)
ADLS_ACUITY_SCORE: 28
HYGIENE/GROOMING: INDEPENDENT
ADLS_ACUITY_SCORE: 28
HYGIENE/GROOMING: INDEPENDENT
ADLS_ACUITY_SCORE: 28

## 2024-08-11 NOTE — PLAN OF CARE
Problem: Psychotic Signs/Symptoms  Goal: Improved Behavioral Control (Psychotic Signs/Symptoms)  Outcome: Progressing   Goal Outcome Evaluation:         The patient is alert and oriented to person, place, and time but has limited insight into his mental health. He is still exhibiting hypersexual behaviors, as he spent most of this shift in front of the nursing station, staring at female staff. He denied experiencing any mental health symptoms. He took his scheduled medication without any issues, and PRN Tylenol was given this shift for headaches. His appetite is good, and his hygiene is fair. His vital signs are stable.

## 2024-08-11 NOTE — PLAN OF CARE
Problem: Psychotic Signs/Symptoms  Goal: Optimal Cognitive Function (Psychotic Signs/Symptoms)  Outcome: Progressing   Milieu Participation:Behavior: Pt calm, isolative to self and visible in the milieu this shift. Observed glaring at select peers and staff, or staring into space. Poverty of content during check in. Denies all mental health s/s. Denies pain. Medication compliant. Pt distractable with some [place] collegraphy art and print outs. Ultimately uninterested in callegraphy and worked on unit folder organizing given at admission.  Report to the court : notice of intent to revoke provisional discharge paperwork available to pt upon request and is in chart.     Safety: status 15 SI/Self harm: denies  Aggression/agitation/HI: denies, exhibited safe behavior  AVH: denies Affect: blunted Mood: calm  Physical Complaints/Issues: denies  PRN Med: Tylenol 650 mg given for head and upper back pain 4/10. Later reported head upper back pain 0/10. Nicorette lozenges x 2.  Medication AE: denies  I & O: eating and drinking well 100%  Sleep: denies concerns  LBM: denies concerns  ADLs: independent  Visits/calls: none    Vitals:  Blood pressure 135/86, pulse 88, temperature 97.6  F (36.4  C), temperature source Temporal, resp. rate 16, height 1.829 m (6'), weight 104.3 kg (229 lb 14.4 oz), SpO2 97%.

## 2024-08-12 PROCEDURE — 250N000013 HC RX MED GY IP 250 OP 250 PS 637: Performed by: CLINICAL NURSE SPECIALIST

## 2024-08-12 PROCEDURE — H2032 ACTIVITY THERAPY, PER 15 MIN: HCPCS

## 2024-08-12 PROCEDURE — 124N000002 HC R&B MH UMMC

## 2024-08-12 PROCEDURE — 99232 SBSQ HOSP IP/OBS MODERATE 35: CPT | Performed by: CLINICAL NURSE SPECIALIST

## 2024-08-12 RX ADMIN — NICOTINE POLACRILEX 4 MG: 4 GUM, CHEWING BUCCAL at 20:58

## 2024-08-12 RX ADMIN — NICOTINE POLACRILEX 4 MG: 4 GUM, CHEWING BUCCAL at 17:06

## 2024-08-12 RX ADMIN — NICOTINE POLACRILEX 4 MG: 4 GUM, CHEWING BUCCAL at 13:44

## 2024-08-12 RX ADMIN — PROPRANOLOL HYDROCHLORIDE 10 MG: 10 TABLET ORAL at 07:53

## 2024-08-12 RX ADMIN — LEVETIRACETAM 1000 MG: 500 TABLET, FILM COATED ORAL at 07:53

## 2024-08-12 RX ADMIN — HYDROXYZINE HYDROCHLORIDE 25 MG: 25 TABLET, FILM COATED ORAL at 10:55

## 2024-08-12 RX ADMIN — ACYCLOVIR 400 MG: 400 TABLET ORAL at 19:12

## 2024-08-12 RX ADMIN — NICOTINE POLACRILEX 4 MG: 4 GUM, CHEWING BUCCAL at 19:05

## 2024-08-12 RX ADMIN — ATOMOXETINE 100 MG: 100 CAPSULE ORAL at 07:54

## 2024-08-12 RX ADMIN — PALIPERIDONE 6 MG: 6 TABLET, EXTENDED RELEASE ORAL at 07:54

## 2024-08-12 RX ADMIN — LEVETIRACETAM 250 MG: 250 TABLET, FILM COATED ORAL at 19:16

## 2024-08-12 RX ADMIN — HYDROXYZINE HYDROCHLORIDE 25 MG: 25 TABLET, FILM COATED ORAL at 21:52

## 2024-08-12 RX ADMIN — ACYCLOVIR 400 MG: 400 TABLET ORAL at 07:54

## 2024-08-12 RX ADMIN — NICOTINE POLACRILEX 4 MG: 4 GUM, CHEWING BUCCAL at 12:05

## 2024-08-12 RX ADMIN — LITHIUM CARBONATE 450 MG: 450 TABLET, EXTENDED RELEASE ORAL at 19:12

## 2024-08-12 RX ADMIN — GABAPENTIN 300 MG: 300 CAPSULE ORAL at 19:12

## 2024-08-12 RX ADMIN — GABAPENTIN 300 MG: 300 CAPSULE ORAL at 07:53

## 2024-08-12 RX ADMIN — LEVETIRACETAM 1000 MG: 500 TABLET, FILM COATED ORAL at 19:12

## 2024-08-12 RX ADMIN — BENZTROPINE MESYLATE 0.5 MG: 0.5 TABLET ORAL at 07:53

## 2024-08-12 RX ADMIN — LITHIUM CARBONATE 450 MG: 450 TABLET, EXTENDED RELEASE ORAL at 07:54

## 2024-08-12 RX ADMIN — QUETIAPINE FUMARATE 200 MG: 200 TABLET ORAL at 19:12

## 2024-08-12 RX ADMIN — NICOTINE POLACRILEX 4 MG: 4 GUM, CHEWING BUCCAL at 07:59

## 2024-08-12 RX ADMIN — LEVETIRACETAM 250 MG: 250 TABLET, FILM COATED ORAL at 07:53

## 2024-08-12 RX ADMIN — GABAPENTIN 300 MG: 300 CAPSULE ORAL at 14:22

## 2024-08-12 RX ADMIN — PROPRANOLOL HYDROCHLORIDE 10 MG: 10 TABLET ORAL at 19:12

## 2024-08-12 RX ADMIN — Medication 25 MCG: at 07:54

## 2024-08-12 ASSESSMENT — ACTIVITIES OF DAILY LIVING (ADL)
ADLS_ACUITY_SCORE: 28
LAUNDRY: UNABLE TO COMPLETE
ADLS_ACUITY_SCORE: 28
ADLS_ACUITY_SCORE: 28
DRESS: INDEPENDENT
DRESS: SCRUBS (BEHAVIORAL HEALTH);INDEPENDENT
ADLS_ACUITY_SCORE: 28
ADLS_ACUITY_SCORE: 28
HYGIENE/GROOMING: INDEPENDENT
ADLS_ACUITY_SCORE: 28
ADLS_ACUITY_SCORE: 28
ORAL_HYGIENE: INDEPENDENT
ADLS_ACUITY_SCORE: 28
HYGIENE/GROOMING: INDEPENDENT
ADLS_ACUITY_SCORE: 28
ORAL_HYGIENE: INDEPENDENT
ADLS_ACUITY_SCORE: 28
ADLS_ACUITY_SCORE: 28
LAUNDRY: UNABLE TO COMPLETE
ADLS_ACUITY_SCORE: 28

## 2024-08-12 NOTE — DISCHARGE INSTRUCTIONS
Behavioral Discharge Planning and Instructions    Summary: You were admitted on 7/31/2024  due to Psychotic Symptomology, agitation.  You were treated by CARLYN Garcia CNP and discharged on 08/15/24 from [facility] to [facility].     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 You are court ordered to take the medications that the doctor ordered for you.     Health Care Follow-up:     You will be going to [facility] and they will coordinate outpatient referrals upon discharge for psychiatry and therapy as needed.     Information will be faxed to your outpatient providers to ensure a healthy continuity of care for you.     Attend all scheduled appointments with your outpatient providers. Call at least 24 hours in advance if you need to reschedule an appointment to ensure continued access to your outpatient providers.     Major Treatments, Procedures and Findings:  You were provided with: a psychiatric assessment, assessed for medical stability, medication evaluation and/or management, individual therapy, milieu management, and medical interventions    Symptoms to Report: feeling more aggressive, increased confusion, losing more sleep, mood getting worse, or thoughts of suicide    Early warning signs can include: increased depression or anxiety sleep disturbances increased thoughts or behaviors of suicide or self-harm  increased unusual thinking, such as paranoia or hearing voices    Safety and Wellness:  Take all medicines as directed.  Make no changes unless your doctor suggests them.      Follow treatment recommendations.  Refrain from alcohol and non-prescribed  drugs.  Ask your support system to help you reduce your access to items that could harm yourself or others. If there is a concern for safety, call 911.    Resources:   Mental Health Crisis Resources  Throughout [place]: call **CRISIS (**146706)  Crisis Text Line: is available for free, 24/7 by texting [place] to 553772  Suicide Awareness Voices of Education (SAVE) (www.save.org): 754-230-IVPT (9954)  The [place] Suicide Prevention Lifeline is now: 988 Suicide and Crisis Lifeline. Call 988 anytime.  National [place] on Mental Illness (www.[place].bishop.org): 775.144.6057 or 606-675-3668.  [place]: text the word LIFE to 09188 for immediate support and crisis intervention  Mental Health Consumer/Survivor Network of [place] (www.mhcsn.net): 335.690.6519 or 618-647-0928  Mental Health Association of [place] (www.mentalhealth.org): 463.465.6510 or 324-972-1573  Peer Support Connection [place] Warmline (PSC) 1-625.210.1667 Available from 5pm - 9am (7 days a week/365 days a year)  Call or Text 986 or [place] 1-272.735.8875    General Medication Instructions:   See your medication sheet(s) for instructions.   Take all medicines as directed.  Make no changes unless your doctor suggests them.   Go to all your doctor visits.  Be sure to have all your required lab tests. This way, your medicines can be refilled on time.  Do not use any drugs not prescribed by your doctor.  Avoid alcohol.    Advance Directives:   Scanned document on file with [employer]? No scanned doc  Is document scanned? Pt states no documents  Honoring Choices Your Rights Handout: Minor - N/A  Was more information offered? Pt declined    The Treatment team has appreciated the opportunity to work with you. If you have any questions or concerns about your recent admission, you can contact the unit which can receive your call 24 hours a day, 7 days a week. They will be able to get in touch with a Provider if needed. The unit number is [phone] .

## 2024-08-12 NOTE — PLAN OF CARE
Group Attendance:  attended partial group     Time session began: 1740  Time session ended: 1703  Patient's total time in group: 3     Total # Attendees    1-2   Group Type psychotherapeutic      Group Topic Covered insight improvement      Group Session Detail Pt was one of two participants in this group. Group focused on ways to take care of self by paying attention to one's surroundings and behavior. Pt appeared to struggle with maintaining focus and did leave after a few minutes.      Patient's response to the group topic/interactions:  Asked brief questions      Patient Details: Brief participation.            No Charge (0-15 min)    Patient Active Problem List   Diagnosis    Psychosis, unspecified psychosis type (H)    Intellectual disability    Acute psychosis (H)

## 2024-08-12 NOTE — PLAN OF CARE
Problem: Psychotic Signs/Symptoms  Goal: Improved Behavioral Control (Psychotic Signs/Symptoms)  Outcome: Progressing  Patient was visible and friendly with both peers and staff while in the milieu. His affect is bright, mood is calm and he denies all mental health symptoms. He is scheduled to discharge tomorrow and he shared with writer that he is looking forward to discharge. Patient is compliant with vitals check and medication administration. He ate breakfast and lunch without issues. Patient is polite and appropriate with no agitation, aggression or safety concerns. He attended group, and took a shower this shift. No complain of pain or discomfort.     BP (!) 137/91 (Patient Position: Standing, Cuff Size: Adult Regular)   Pulse 94   Temp 97.1  F (36.2  C) (Temporal)   Resp 17   Ht 1.829 m (6')   Wt 104.3 kg (229 lb 14.4 oz)   SpO2 96%   BMI 31.18 kg/m

## 2024-08-12 NOTE — PROGRESS NOTES
Rehab Group     Start time: 1115  End time: 1400  Patient time total: 30 minutes     attended partial group     #4 attended   Group Type: music   Group Topic Covered: balanced lifestyle, healthy leisure time, relaxation , self-care, and self-esteem      Group Session Detail:  1) Mindfulness-Relaxation  2)Electronic beat-making      Patient Response/Contribution:  attentive and actively engaged      Patient Detail: Edward participated in parts of both morning and afternoon sessions.  He avoided eye contact, but interacted with the music and beat making equipment.  He wandered about group area, at times stopping to participate.  He likes to get sheets of lined yellow paper from [employer] for his writing projects.  Keeps mostly to himself.  Flat affect.       Activity Therapy Per 15 min ()    Patient Active Problem List   Diagnosis    Psychosis, unspecified psychosis type (H)    Intellectual disability    Acute psychosis (H)
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
## 2024-08-12 NOTE — PLAN OF CARE
BEH IP Unit Acuity Rating Score (UARS)  Patient is given one point for every criteria they meet.    CRITERIA SCORING   On a 72 hour hold, court hold, committed, stay of commitment, or revocation. 1    Patient LOS on BEH unit exceeds 20 days. 0  LOS: 12   Patient under guardianship, 55+, otherwise medically complex, or under age 11. 0   Suicide ideation without relief of precipitating factors. 0   Current plan for suicide. 0   Current plan for homicide. 0   Imminent risk or actual attempt to seriously harm another without relief of factors precipitating the attempt. 0   Severe dysfunction in daily living (ex: complete neglect for self care, extreme disruption in vegetative function, extreme deterioration in social interactions). 1   Recent (last 7 days) or current physical aggression in the ED or on unit. 0   Restraints or seclusion episode in past 72 hours. 0   Recent (last 7 days) or current verbal aggression, agitation, yelling, etc., while in the ED or unit. 0   Active psychosis. 1   Need for constant or near constant redirection (from leaving, from others, etc).  0   Intrusive or disruptive behaviors. 0   Patient requires 3 or more hours of individualized nursing care per 8-hour shift (i.e. for ADLs, meds, therapeutic interventions). 0   TOTAL 3

## 2024-08-12 NOTE — PLAN OF CARE
Goal Outcome Evaluation:  Problem: Sleep Disturbance  Goal: Adequate Sleep/Rest  Outcome: Progressing        Pt in bed at beginning of shift, breathing quiet and unlabored. Pt slept through shift. Pt slept 5.25 hours.      No pt complaints or concerns at this time.      No PRNs given. Will continue to monitor.

## 2024-08-12 NOTE — PLAN OF CARE
Problem: Psychotic Signs/Symptoms  Goal: Improved Behavioral Control (Psychotic Signs/Symptoms)  Outcome: Progressing  Flowsheets (Taken 8/11/2024 2117)  Mutually Determined Action Steps (Improved Behavioral Control):   verbalizes personal treatment goal   other (see comments)   Goal Outcome Evaluation:         Throughout the evening, the patient spent most of his time pacing back and forth in the hallway and was also seen watching TV with his peers. The patient is alert and oriented to person, place, and time but has limited insight into his mental health. He continues to exhibit hypersexual behaviors by staring at female staff and giving compliments. He took his scheduled medication without any issues and was given Tylenol for headaches as needed during this shift. He has a good appetite fair hygiene, and stable vital signs.

## 2024-08-13 LAB — LITHIUM SERPL-SCNC: 0.47 MMOL/L (ref 0.6–1.2)

## 2024-08-13 PROCEDURE — 80178 ASSAY OF LITHIUM: CPT | Performed by: CLINICAL NURSE SPECIALIST

## 2024-08-13 PROCEDURE — 99231 SBSQ HOSP IP/OBS SF/LOW 25: CPT | Performed by: CLINICAL NURSE SPECIALIST

## 2024-08-13 PROCEDURE — 36415 COLL VENOUS BLD VENIPUNCTURE: CPT | Performed by: CLINICAL NURSE SPECIALIST

## 2024-08-13 PROCEDURE — 124N000002 HC R&B MH UMMC

## 2024-08-13 PROCEDURE — 250N000013 HC RX MED GY IP 250 OP 250 PS 637: Performed by: CLINICAL NURSE SPECIALIST

## 2024-08-13 RX ADMIN — BENZTROPINE MESYLATE 0.5 MG: 0.5 TABLET ORAL at 09:03

## 2024-08-13 RX ADMIN — GABAPENTIN 300 MG: 300 CAPSULE ORAL at 09:03

## 2024-08-13 RX ADMIN — NICOTINE POLACRILEX 4 MG: 4 GUM, CHEWING BUCCAL at 18:32

## 2024-08-13 RX ADMIN — ACYCLOVIR 400 MG: 400 TABLET ORAL at 08:59

## 2024-08-13 RX ADMIN — QUETIAPINE FUMARATE 200 MG: 200 TABLET ORAL at 19:44

## 2024-08-13 RX ADMIN — ATOMOXETINE 100 MG: 100 CAPSULE ORAL at 09:03

## 2024-08-13 RX ADMIN — NICOTINE POLACRILEX 4 MG: 4 GUM, CHEWING BUCCAL at 14:17

## 2024-08-13 RX ADMIN — LEVETIRACETAM 250 MG: 250 TABLET, FILM COATED ORAL at 19:45

## 2024-08-13 RX ADMIN — PROPRANOLOL HYDROCHLORIDE 10 MG: 10 TABLET ORAL at 19:44

## 2024-08-13 RX ADMIN — GABAPENTIN 300 MG: 300 CAPSULE ORAL at 14:17

## 2024-08-13 RX ADMIN — Medication 25 MCG: at 09:03

## 2024-08-13 RX ADMIN — LEVETIRACETAM 1000 MG: 500 TABLET, FILM COATED ORAL at 19:44

## 2024-08-13 RX ADMIN — LEVETIRACETAM 1000 MG: 500 TABLET, FILM COATED ORAL at 08:45

## 2024-08-13 RX ADMIN — GABAPENTIN 300 MG: 300 CAPSULE ORAL at 19:44

## 2024-08-13 RX ADMIN — LITHIUM CARBONATE 450 MG: 450 TABLET, EXTENDED RELEASE ORAL at 19:44

## 2024-08-13 RX ADMIN — NICOTINE POLACRILEX 4 MG: 4 GUM, CHEWING BUCCAL at 09:17

## 2024-08-13 RX ADMIN — LITHIUM CARBONATE 450 MG: 450 TABLET, EXTENDED RELEASE ORAL at 09:03

## 2024-08-13 RX ADMIN — NICOTINE POLACRILEX 4 MG: 4 GUM, CHEWING BUCCAL at 20:57

## 2024-08-13 RX ADMIN — ACYCLOVIR 400 MG: 400 TABLET ORAL at 19:45

## 2024-08-13 RX ADMIN — PALIPERIDONE 6 MG: 6 TABLET, EXTENDED RELEASE ORAL at 09:03

## 2024-08-13 RX ADMIN — LEVETIRACETAM 250 MG: 250 TABLET, FILM COATED ORAL at 08:45

## 2024-08-13 RX ADMIN — NICOTINE POLACRILEX 4 MG: 4 GUM, CHEWING BUCCAL at 16:50

## 2024-08-13 RX ADMIN — PROPRANOLOL HYDROCHLORIDE 10 MG: 10 TABLET ORAL at 09:14

## 2024-08-13 ASSESSMENT — ACTIVITIES OF DAILY LIVING (ADL)
ADLS_ACUITY_SCORE: 28
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
## 2024-08-13 NOTE — PLAN OF CARE
BEH IP Unit Acuity Rating Score (UARS)  Patient is given one point for every criteria they meet.    CRITERIA SCORING   On a 72 hour hold, court hold, committed, stay of commitment, or revocation. 1    Patient LOS on BEH unit exceeds 20 days. 0  LOS: 13   Patient under guardianship, 55+, otherwise medically complex, or under age 11. 0   Suicide ideation without relief of precipitating factors. 0   Current plan for suicide. 0   Current plan for homicide. 0   Imminent risk or actual attempt to seriously harm another without relief of factors precipitating the attempt. 0   Severe dysfunction in daily living (ex: complete neglect for self care, extreme disruption in vegetative function, extreme deterioration in social interactions). 1   Recent (last 7 days) or current physical aggression in the ED or on unit. 0   Restraints or seclusion episode in past 72 hours. 0   Recent (last 7 days) or current verbal aggression, agitation, yelling, etc., while in the ED or unit. 0   Active psychosis. 1   Need for constant or near constant redirection (from leaving, from others, etc).  0   Intrusive or disruptive behaviors. 0   Patient requires 3 or more hours of individualized nursing care per 8-hour shift (i.e. for ADLs, meds, therapeutic interventions). 0   TOTAL 3

## 2024-08-13 NOTE — PLAN OF CARE
Problem: Psychotic Signs/Symptoms  Goal: Improved Mood Symptoms (Psychotic Signs/Symptoms)  Outcome: Progressing  Patient's mood this morning is tense, and affect is labile. He did not attend group, and he did not interact with peers. He was in his room sleeping at the start of shift and writer had to wake him up to administer medication. When writer went to his room to get vitals, patient did not respond to any of writers questions or comments, did not make eye contact, but would do what writer is asking of him. He took his medication without issues, but he refused his lithium level blood draw. During lunch time, writer was able to talk to patient and he agreed to get the lab done. He is scheduled to be discharged tomorrow morning at about 8am. Patient is eating and drinking without issues.     /84 (BP Location: Right arm, Patient Position: Sitting, Cuff Size: Adult Regular)   Pulse 100   Temp 98.1  F (36.7  C) (Temporal)   Resp 14   Ht 1.829 m (6')   Wt 104.3 kg (229 lb 14.4 oz)   SpO2 97%   BMI 31.18 kg/m

## 2024-08-13 NOTE — PLAN OF CARE
Goal Outcome Evaluation:    Problem: Psychotic Signs/Symptoms  Goal: Improved Behavioral Control (Psychotic Signs/Symptoms)  Outcome: Progressing          Throughout this shift, the patient spent most of the evening in the hallway and was in and out of his room. His mood appeared bright and friendly. There were no behavioral issues this shift and denied all mental health symptoms including anxiety and depression. He took his scheduled medication without any issues. The patient will most likely be discharged tomorrow or on Wednesday to another facility. His appetite is good.
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
## 2024-08-14 VITALS
SYSTOLIC BLOOD PRESSURE: 132 MMHG | WEIGHT: 229.9 LBS | HEART RATE: 103 BPM | DIASTOLIC BLOOD PRESSURE: 93 MMHG | RESPIRATION RATE: 16 BRPM | BODY MASS INDEX: 31.14 KG/M2 | OXYGEN SATURATION: 96 % | TEMPERATURE: 98.3 F | HEIGHT: 72 IN

## 2024-08-14 PROCEDURE — 250N000013 HC RX MED GY IP 250 OP 250 PS 637: Performed by: CLINICAL NURSE SPECIALIST

## 2024-08-14 PROCEDURE — 99238 HOSP IP/OBS DSCHRG MGMT 30/<: CPT | Performed by: CLINICAL NURSE SPECIALIST

## 2024-08-14 RX ADMIN — LEVETIRACETAM 250 MG: 250 TABLET, FILM COATED ORAL at 08:13

## 2024-08-14 RX ADMIN — ACETAMINOPHEN 650 MG: 325 TABLET, FILM COATED ORAL at 07:14

## 2024-08-14 RX ADMIN — LEVETIRACETAM 1000 MG: 500 TABLET, FILM COATED ORAL at 08:13

## 2024-08-14 RX ADMIN — NICOTINE POLACRILEX 4 MG: 4 GUM, CHEWING BUCCAL at 10:47

## 2024-08-14 RX ADMIN — ATOMOXETINE 100 MG: 100 CAPSULE ORAL at 08:14

## 2024-08-14 RX ADMIN — LITHIUM CARBONATE 450 MG: 450 TABLET, EXTENDED RELEASE ORAL at 08:14

## 2024-08-14 RX ADMIN — BENZTROPINE MESYLATE 0.5 MG: 0.5 TABLET ORAL at 08:14

## 2024-08-14 RX ADMIN — PROPRANOLOL HYDROCHLORIDE 10 MG: 10 TABLET ORAL at 08:14

## 2024-08-14 RX ADMIN — NICOTINE POLACRILEX 4 MG: 4 GUM, CHEWING BUCCAL at 09:01

## 2024-08-14 RX ADMIN — Medication 25 MCG: at 08:14

## 2024-08-14 RX ADMIN — GABAPENTIN 300 MG: 300 CAPSULE ORAL at 08:13

## 2024-08-14 RX ADMIN — ACYCLOVIR 400 MG: 400 TABLET ORAL at 08:13

## 2024-08-14 RX ADMIN — PALIPERIDONE 6 MG: 6 TABLET, EXTENDED RELEASE ORAL at 08:13

## 2024-08-14 ASSESSMENT — ACTIVITIES OF DAILY LIVING (ADL)
ADLS_ACUITY_SCORE: 28

## 2024-08-14 NOTE — PLAN OF CARE
Discharge Note    Patient discharged at 1204. A  picked him up from the [facility]/12 entrance. Patient was educated on his AVS. He voiced having an understanding, no questions asked. He will be receiving medications at the facility he is transported to. He left in his personal clothing. Writer went over patient's belongings with him, all items were present. Patient seemed happy to be discharging. He denied all MH S&S. Affect presented as flat. Denies pain. No other concerns.

## 2024-08-14 NOTE — PLAN OF CARE
Problem: Adult Behavioral Health Plan of Care  Goal: Plan of Care Review  Outcome: Progressing  Flowsheets (Taken 8/13/2024 0464)  Patient Agreement with Plan of Care: agrees     Problem: Psychotic Signs/Symptoms  Goal: Improved Behavioral Control (Psychotic Signs/Symptoms)  Outcome: Progressing   Goal Outcome Evaluation:    Plan of Care Reviewed With: patient      Earlier this evening, he appeared restless, frequently walking in and out of his room and into the hallway. However, he denied experiencing suicidal thoughts, anxiety, or any other psychiatric symptoms. While he seemed calm and felt fine, his facial expression remained neutral. During the assessment, he smiled when we discussed his departure tomorrow. He made multiple requests for nicotine gum. His concerns about eating, sleeping, and using the restroom were minimal. He took his medications without any issues. The Patient's belongings from the security are in med room. The Patient is accepted to [facility] for Wednesday, 8/14, with  time around 8-10 am by the .

## 2024-08-14 NOTE — PLAN OF CARE
Goal Outcome Evaluation:  Problem: Sleep Disturbance  Goal: Adequate Sleep/Rest  Outcome: Progressing     Patient slept most of the night without s/s of respiratory depression. No behavioral/medical concern reported or noted. Requested PRN Tylenol for lower back pain rated 5/10 and given at 0714. A representative from [facility] called and asked if patient experienced new symptoms within the last 24 hours. This writer said no. Patient slept 5.75 hours.
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
## 2024-08-14 NOTE — PLAN OF CARE
Pt discharged arely Burks to [facility].  I faxed PD and COS to [facility].

## 2024-09-04 ENCOUNTER — TELEPHONE (OUTPATIENT)
Dept: NEUROLOGY | Facility: CLINIC | Age: 24
End: 2024-09-04

## 2024-09-04 NOTE — TELEPHONE ENCOUNTER
Bushra called to discuss the keppra medication and the mental health issue that the patient is having. Would like a call back to discuss this issue.

## 2024-09-06 NOTE — TELEPHONE ENCOUNTER
Per   I could not reach this caller on several tries.  I left a voicemail message indicating that I have not seen this patient, and am not able to address specific information with regard to anti-seizure medication selection in a patient who I have seen.  I suggested reviewing possible levetiracetam adverse effects with the prescribing physician.

## 2024-11-07 ENCOUNTER — OFFICE VISIT (OUTPATIENT)
Dept: NEUROLOGY | Facility: CLINIC | Age: 24
End: 2024-11-07
Payer: COMMERCIAL

## 2024-11-07 VITALS
OXYGEN SATURATION: 94 % | DIASTOLIC BLOOD PRESSURE: 77 MMHG | TEMPERATURE: 98.4 F | HEART RATE: 109 BPM | SYSTOLIC BLOOD PRESSURE: 122 MMHG | BODY MASS INDEX: 33.17 KG/M2 | WEIGHT: 244.6 LBS | RESPIRATION RATE: 20 BRPM

## 2024-11-07 DIAGNOSIS — G40.909 INTELLECTUAL DISABILITY WITH EPILEPSY (H): ICD-10-CM

## 2024-11-07 DIAGNOSIS — F19.90 POLYSUBSTANCE USE DISORDER: ICD-10-CM

## 2024-11-07 DIAGNOSIS — F79 INTELLECTUAL DISABILITY WITH EPILEPSY (H): ICD-10-CM

## 2024-11-07 ASSESSMENT — PAIN SCALES - GENERAL: PAINLEVEL_OUTOF10: NO PAIN (0)
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
